# Patient Record
Sex: MALE | Race: BLACK OR AFRICAN AMERICAN | NOT HISPANIC OR LATINO | Employment: FULL TIME | URBAN - METROPOLITAN AREA
[De-identification: names, ages, dates, MRNs, and addresses within clinical notes are randomized per-mention and may not be internally consistent; named-entity substitution may affect disease eponyms.]

---

## 2017-01-04 ENCOUNTER — GENERIC CONVERSION - ENCOUNTER (OUTPATIENT)
Dept: OTHER | Facility: OTHER | Age: 32
End: 2017-01-04

## 2017-02-02 ENCOUNTER — GENERIC CONVERSION - ENCOUNTER (OUTPATIENT)
Dept: OTHER | Facility: OTHER | Age: 32
End: 2017-02-02

## 2017-12-08 ENCOUNTER — ALLSCRIPTS OFFICE VISIT (OUTPATIENT)
Dept: OTHER | Facility: OTHER | Age: 32
End: 2017-12-08

## 2017-12-08 ENCOUNTER — GENERIC CONVERSION - ENCOUNTER (OUTPATIENT)
Dept: OTHER | Facility: OTHER | Age: 32
End: 2017-12-08

## 2017-12-08 DIAGNOSIS — E78.5 HYPERLIPIDEMIA: ICD-10-CM

## 2017-12-08 DIAGNOSIS — E55.9 VITAMIN D DEFICIENCY: ICD-10-CM

## 2017-12-08 DIAGNOSIS — Z00.00 ENCOUNTER FOR GENERAL ADULT MEDICAL EXAMINATION WITHOUT ABNORMAL FINDINGS: ICD-10-CM

## 2017-12-09 LAB
25(OH)D3 SERPL-MCNC: 48.2 NG/ML (ref 30–100)
A/G RATIO (HISTORICAL): 1.9 (ref 1.2–2.2)
ALBUMIN SERPL BCP-MCNC: 4.5 G/DL (ref 3.5–5.5)
ALP SERPL-CCNC: 51 IU/L (ref 39–117)
ALT SERPL W P-5'-P-CCNC: 16 IU/L (ref 0–44)
AST SERPL W P-5'-P-CCNC: 21 IU/L (ref 0–40)
BASOPHILS # BLD AUTO: 0 %
BASOPHILS # BLD AUTO: 0 X10E3/UL (ref 0–0.2)
BILIRUB SERPL-MCNC: 0.5 MG/DL (ref 0–1.2)
BUN SERPL-MCNC: 12 MG/DL (ref 6–20)
BUN/CREA RATIO (HISTORICAL): 12 (ref 9–20)
CALCIUM SERPL-MCNC: 9.5 MG/DL (ref 8.7–10.2)
CHLORIDE SERPL-SCNC: 100 MMOL/L (ref 96–106)
CHOLEST SERPL-MCNC: 210 MG/DL (ref 100–199)
CO2 SERPL-SCNC: 23 MMOL/L (ref 18–29)
CREAT SERPL-MCNC: 0.99 MG/DL (ref 0.76–1.27)
DEPRECATED RDW RBC AUTO: 14.2 % (ref 12.3–15.4)
EGFR AFRICAN AMERICAN (HISTORICAL): 116 ML/MIN/1.73
EGFR-AMERICAN CALC (HISTORICAL): 100 ML/MIN/1.73
EOSINOPHIL # BLD AUTO: 0.1 X10E3/UL (ref 0–0.4)
EOSINOPHIL # BLD AUTO: 3 %
GLUCOSE SERPL-MCNC: 74 MG/DL (ref 65–99)
HCT VFR BLD AUTO: 43.2 % (ref 37.5–51)
HDLC SERPL-MCNC: 63 MG/DL
HGB BLD-MCNC: 14 G/DL (ref 13–17.7)
IMM.GRANULOCYTES (CD4/8) (HISTORICAL): 0 %
IMM.GRANULOCYTES (CD4/8) (HISTORICAL): 0 X10E3/UL (ref 0–0.1)
LDLC SERPL CALC-MCNC: 136 MG/DL (ref 0–99)
LYMPHOCYTES # BLD AUTO: 2 X10E3/UL (ref 0.7–3.1)
LYMPHOCYTES # BLD AUTO: 48 %
MCH RBC QN AUTO: 22.7 PG (ref 26.6–33)
MCHC RBC AUTO-ENTMCNC: 32.4 G/DL (ref 31.5–35.7)
MCV RBC AUTO: 70 FL (ref 79–97)
MONOCYTES # BLD AUTO: 0.3 X10E3/UL (ref 0.1–0.9)
MONOCYTES (HISTORICAL): 7 %
NEUTROPHILS # BLD AUTO: 1.8 X10E3/UL (ref 1.4–7)
NEUTROPHILS # BLD AUTO: 42 %
PLATELET # BLD AUTO: 296 X10E3/UL (ref 150–379)
POTASSIUM SERPL-SCNC: 4.4 MMOL/L (ref 3.5–5.2)
RBC (HISTORICAL): 6.16 X10E6/UL (ref 4.14–5.8)
SODIUM SERPL-SCNC: 141 MMOL/L (ref 134–144)
TOT. GLOBULIN, SERUM (HISTORICAL): 2.4 G/DL (ref 1.5–4.5)
TOTAL PROTEIN (HISTORICAL): 6.9 G/DL (ref 6–8.5)
TRIGL SERPL-MCNC: 56 MG/DL (ref 0–149)
WBC # BLD AUTO: 4.3 X10E3/UL (ref 3.4–10.8)

## 2017-12-10 ENCOUNTER — GENERIC CONVERSION - ENCOUNTER (OUTPATIENT)
Dept: OTHER | Facility: OTHER | Age: 32
End: 2017-12-10

## 2018-01-11 NOTE — RESULT NOTES
Message   labs show elevated cholesterol and needs low cholesterol diet and exercise, also his liver enzyme is borderline elevated, rec  low fat diet and exercise  Verified Results  (1) COMPREHENSIVE METABOLIC PANEL [id number] 60:41LG Samantha Gottlieb     Test Name Result Flag Reference   Glucose, Serum 70 mg/dL  65-99   Specimen received hemolyzed  Clinical correlation indicated  BUN 16 mg/dL  6-20   Creatinine, Serum 1 11 mg/dL  0 76-1 27   eGFR If NonAfricn Am 88 mL/min/1 73  >59   eGFR If Africn Am 102 mL/min/1 73  >59   BUN/Creatinine Ratio 14  8-19   Sodium, Serum 139 mmol/L  134-144   Potassium, Serum 5 2 mmol/L  3 5-5 2   Chloride, Serum 97 mmol/L     Carbon Dioxide, Total 22 mmol/L  18-29   Calcium, Serum 9 6 mg/dL  8 7-10 2   Protein, Total, Serum 7 4 g/dL  6 0-8 5   Albumin, Serum 4 6 g/dL  3 5-5 5   Globulin, Total 2 8 g/dL  1 5-4 5   A/G Ratio 1 6  1 1-2 5   Bilirubin, Total 0 5 mg/dL  0 0-1 2   Alkaline Phosphatase, S 53 IU/L     AST (SGOT) 46 IU/L H 0-40   ALT (SGPT) 30 IU/L  0-44     (1) VITAMIN D 25-HYDROXY [id number] 10:53AM Samantha Gottlieb     Test Name Result Flag Reference   Vitamin D, 25-Hydroxy 42 4 ng/mL  30 0-100 0   Vitamin D deficiency has been defined by the [place]  [employer] and an Endocrine Society practice guideline as a  level of serum 25-OH vitamin D less than 20 ng/mL (1,2)  The Endocrine Society went on to further define vitamin D  insufficiency as a level between 21 and 29 ng/mL (2)  1  IOM ([place] [employer])  2010  Dietary reference     intakes for calcium and D  [facility]: The     [facility]  2  Dottie MF, Gilberto NEWTON, Ping MARINA, et al      Evaluation, treatment, and prevention of vitamin D     deficiency: an Endocrine Society clinical practice     guideline  JCEM  2011 Jul; 96(7):1911-30       (1) LIPID PANEL FASTING W DIRECT LDL REFLEX [id number] 10:53AM Samantha Gottlieb     Test Name Result Flag Reference Cholesterol, Total 215 mg/dL H 100-199   Triglycerides 47 mg/dL  0-149   HDL Cholesterol 51 mg/dL  >39   LDL Cholesterol Calc 155 mg/dL H 0-99

## 2018-01-14 NOTE — PROGRESS NOTES
Assessment    1  Encounter for preventive health examination (V70 0) (Z00 00)   2  Vitamin D deficiency (268 9) (E55 9)   3  Hyperlipidemia (272 4) (E78 5)    Plan  Health Maintenance, Hyperlipidemia, Vitamin D deficiency    · (1) COMPREHENSIVE METABOLIC PANEL; Status:Active; Requested for:43Kld3316;    · (1) LIPID PANEL FASTING W DIRECT LDL REFLEX; Status:Active; Requested  for:26Ueg6193;    · (1) VITAMIN D 25-HYDROXY; Status:Active; Requested for:49Ldj9181;    · Follow-up visit in 1 year Evaluation and Treatment  Follow-up  Status: Complete  Done:  [id number]    Discussion/Summary  Impression: health maintenance visit  Currently, he eats an adequate diet and has an adequate exercise regimen  Testicular cancer screening: the risks and benefits of testicular cancer screening were discussed  Screening lab work includes glucose, lipid profile and 25-hydroxyvitamin D  The risks and benefits of immunizations were discussed  He was advised to be evaluated by a dentist  Advice and education were given regarding nutrition, aerobic exercise, weight bearing exercise, vitamin D supplements, reproductive health, self skin examination and seat belt use  General PE done today  Take Vitamin D as directed  Low cholesterol diet encouraged  Get labs as directed for hx of elevated LDL but elevated HDL  Exercise as directed  Flexibility encouraged, cardio exercise encouraged  Pt  is going to school for being a  PhD scholarship  The treatment plan was reviewed with the patient/guardian  The patient/guardian understands and agrees with the treatment plan   The patient was counseled regarding  Chief Complaint  Pt here for yearly physical, no problems or concerns  Depression screening negative  History of Present Illness  HM, Adult Male: The patient is being seen for a health maintenance evaluation  General Health: The patient's health since the last visit is described as good  He has regular dental visits   He denies vision problems  He denies hearing loss  Immunizations status: up to date  Lifestyle:  He consumes a diverse and healthy diet  He does not have any weight concerns  He exercises regularly  He does not use tobacco  He denies alcohol use  He denies drug use  Screening: cancer screening reviewed and current  metabolic screening reviewed and current  risk screening reviewed and current  HPI: Pt here for yearly physical, no problems or concerns  Depression screening negative  Hx of high LDL and vitamin D deficiency, Works for [employer] and lives in [place]  Pt  Is in a relationship and is celibate  He has no cp or sob, or ha  He exercises and eats healthy  Trying to do more cardio exercise also  He is lifting weights  He is going for PhD to be a  via scholarship also  No abdominal pain or orthopedic issues  Review of Systems    Constitutional: No fever or chills, feels well, no tiredness, no recent weight gain or weight loss  Eyes: No complaints of eye pain, no red eyes, no discharge from eyes, no itchy eyes  ENT: no complaints of earache, no hearing loss, no nosebleeds, no nasal discharge, no sore throat, no hoarseness  Cardiovascular: No complaints of slow heart rate, no fast heart rate, no chest pain, no palpitations, no leg claudication, no lower extremity  Respiratory: No complaints of shortness of breath, no wheezing, no cough, no SOB on exertion, no orthopnea or PND  Gastrointestinal: No complaints of abdominal pain, no constipation, no nausea or vomiting, no diarrhea or bloody stools  Genitourinary: No complaints of dysuria, no incontinence, no hesitancy, no nocturia, no genital lesion, no testicular pain  Musculoskeletal: No complaints of arthralgia, no myalgias, no joint swelling or stiffness, no limb pain or swelling  Integumentary: No complaints of skin rash or skin lesions, no itching, no skin wound, no dry skin     Neurological: No compliants of headache, no confusion, no convulsions, no numbness or tingling, no dizziness or fainting, no limb weakness, no difficulty walking  Psychiatric: Is not suicidal, no sleep disturbances, no anxiety or depression, no change in personality, no emotional problems  Endocrine: No complaints of proptosis, no hot flashes, no muscle weakness, no erectile dysfunction, no deepening of the voice, no feelings of weakness  Hematologic/Lymphatic: No complaints of swollen glands, no swollen glands in the neck, does not bleed easily, no easy bruising  Active Problems    1  Acne (706 1) (L70 9)   2  Alpha Thalassemia Trait (282 46)   3  Antibody Response (V72 61)   4  Hyperlipidemia (272 4) (E78 5)   5  Neck pain (723 1) (M54 2)   6  History of Need for MMR vaccine (V06 4) (Z23)   7  Screening examination for pulmonary tuberculosis (V74 1) (Z11 1)   8  Vitamin D deficiency (268 9) (E55 9)    Past Medical History    · History of Cellulitis (682 9) (L03 90)   · History of Contusion, nose (920) (S00 33XA)   · History of Need for MMR vaccine (V06 4) (Z23)   · History of Pain of finger of right hand (729 5) (M79 644)   · History of Screening for STD (sexually transmitted disease) (V74 5) (Z11 3)    Surgical History    · History of Oral Surgery    Family History  Mother    · Family history of diabetes mellitus (V18 0) (Z83 3)  Father    · Family history of hypertension (V17 49) (Z82 49)    Social History    · Being A Social Drinker   · Never A Smoker    Current Meds   1  Vitamin D3 3000 UNIT Oral Tablet; Take one tablet daily as directed; Therapy: 00BIO0340 to (Last Rx:44Qfw1445) Ordered    Allergies    1  No Known Drug Allergies    Vitals   Recorded: 23FBL5790 19:07FF   Systolic 204   Diastolic 80   Height 5 ft 7 in   Weight 181 lb 8 0 oz   BMI Calculated 28 43   BSA Calculated 1 94     Physical Exam    Constitutional   General appearance: No acute distress, well appearing and well nourished      Eyes   Conjunctiva and lids: No erythema, swelling or discharge  Pupils and irises: Equal, round, reactive to light  Ophthalmoscopic examination: Normal fundi and optic discs  Ears, Nose, Mouth, and Throat   External inspection of ears and nose: Normal     Otoscopic examination: Tympanic membranes translucent with normal light reflex  Canals patent without erythema  Hearing: Normal     Nasal mucosa, septum, and turbinates: Normal without edema or erythema  Lips, teeth, and gums: Normal, good dentition  Oropharynx: Normal with no erythema, edema, exudate or lesions  Neck   Neck: Supple, symmetric, trachea midline, no masses  Thyroid: Normal, no thyromegaly  Pulmonary   Respiratory effort: No increased work of breathing or signs of respiratory distress  Percussion of chest: Normal     Palpation of chest: Normal     Auscultation of lungs: Clear to auscultation  Cardiovascular   Palpation of heart: Normal PMI, no thrills  Auscultation of heart: Normal rate and rhythm, normal S1 and S2, no murmurs  Carotid pulses: 2+ bilaterally  Abdominal aorta: Normal     Femoral pulses: 2+ bilaterally  Pedal pulses: 2+ bilaterally  Examination of extremities for edema and/or varicosities: Normal     Chest   Breasts: Normal, no dimpling or skin changes appreciated  Palpation of breasts and axillae: Normal, no masses palpated  Chest: Normal     Abdomen   Abdomen: Non-tender, no masses  Liver and spleen: No hepatomegaly or splenomegaly  Examination for hernias: No hernias appreciated  Anus, perineum, and rectum: Normal sphincter tone, no masses, no prolapse  Stool sample for occult blood: Negative  Genitourinary   Scrotal contents: Normal testes, no masses  Penis: Normal, no lesions  Digital rectal exam of prostate: Normal size, no masses  Lymphatic   Palpation of lymph nodes in neck: No lymphadenopathy  Palpation of lymph nodes in axillae: No lymphadenopathy      Palpation of lymph nodes in groin: No lymphadenopathy  Palpation of lymph nodes in other areas: No lymphadenopathy  Musculoskeletal   Gait and station: Normal     Inspection/palpation of digits and nails: Normal without clubbing or cyanosis  Inspection/palpation of joints, bones, and muscles: Normal     Range of motion: Normal     Stability: Normal     Muscle strength/tone: Normal     Skin   Skin and subcutaneous tissue: Normal without rashes or lesions  Palpation of skin and subcutaneous tissue: Normal turgor  Neurologic   Cranial nerves: Cranial nerves 2-12 intact  Reflexes: 2+ and symmetric  Sensation: No sensory loss  Psychiatric   Judgment and insight: Normal     Orientation to person, place and time: Normal     Recent and remote memory: Intact  Mood and affect: Normal        Results/Data  PHQ-2 Adult Depression Screening [id number] 09:55AM User, s     Test Name Result Flag Reference   PHQ-2 Adult Depression Score 0     Over the last two weeks, how often have you been bothered by any of the following problems?   Little interest or pleasure in doing things: Not at all - 0  Feeling down, depressed, or hopeless: Not at all - 0   PHQ-2 Adult Depression Screening Negative         Signatures   Electronically signed by : Mela Keyes DO; Dec 30 2016 10:25AM EST                       (Author)

## 2018-01-17 NOTE — RESULT NOTES
Message   HIs LDL cholesterol is elevated but HDL is good  Rec  low cholesterol diet and high fiber diet to help lower bad cholesterol and rec  exercise to increase HDL  Recheck lipids in 6 months with cmp  Verified Results  (1) COMPREHENSIVE METABOLIC PANEL [id number] 68:06HB Josiephine Chimera     Test Name Result Flag Reference   Glucose, Serum 86 mg/dL  65-99   BUN 11 mg/dL  6-20   Creatinine, Serum 0 95 mg/dL  0 76-1 27   eGFR If NonAfricn Am 107 mL/min/1 73  >59   eGFR If Africn Am 124 mL/min/1 73  >59   BUN/Creatinine Ratio 12  8-19   Sodium, Serum 141 mmol/L  134-144   Potassium, Serum 4 3 mmol/L  3 5-5 2   Chloride, Serum 100 mmol/L     Carbon Dioxide, Total 26 mmol/L  18-29   Calcium, Serum 9 9 mg/dL  8 7-10 2   Protein, Total, Serum 7 6 g/dL  6 0-8 5   Albumin, Serum 4 9 g/dL  3 5-5 5   Globulin, Total 2 7 g/dL  1 5-4 5   A/G Ratio 1 8  1 1-2 5   Bilirubin, Total 0 4 mg/dL  0 0-1 2   Alkaline Phosphatase, S 49 IU/L     AST (SGOT) 18 IU/L  0-40   ALT (SGPT) 13 IU/L  0-44     (LC) Lipid Panel 01Apr2016 02:20PM Josiephine Chimera     Test Name Result Flag Reference   Cholesterol, Total 213 mg/dL H 100-199   Triglycerides 50 mg/dL  0-149   HDL Cholesterol 56 mg/dL  >39   According to ATP-III Guidelines, HDL-C >59 mg/dL is considered a  negative risk factor for CHD     VLDL Cholesterol Rico 10 mg/dL  5-40   LDL Cholesterol Calc 147 mg/dL H 0-99     (1) HEMOGLOBIN A1C 01Apr2016 02:20PM Josiephine Chimera     Test Name Result Flag Reference   Hemoglobin A1c 5 6 %  4 8-5 6   Pre-diabetes: 5 7 - 6 4           Diabetes: >6 4           Glycemic control for adults with diabetes: <7 0     (1) TSH 01Apr2016 02:20PM Josiephine Chimera     Test Name Result Flag Reference   TSH 2 140 uIU/mL  0 450-4 500     [facility]) Thyroxine (T4) Free, Direct, S 01Apr2016 02:20PM Josiephine Chimera     Test Name Result Flag Reference   T4,Free(Direct) 1 42 ng/dL  0 82-1 77     (1) VITAMIN D 25-HYDROXY 01Apr2016 02:20PM Moses Manzano [address]     Test Name Result Flag Reference   Vitamin D, 25-Hydroxy 48 9 ng/mL  30 0-100 0   Vitamin D deficiency has been defined by the [address] practice guideline as a  level of serum 25-OH vitamin D less than 20 ng/mL (1,2)  The Endocrine Society went on to further define vitamin D  insufficiency as a level between 21 and 29 ng/mL (2)  1  IOM ([place] [employer])  2010  Dietary reference     intakes for calcium and D  [facility]: The     [employer]  2  Dottie REA, Gilberto NEWTON, Ping MARINA, et al      Evaluation, treatment, and prevention of vitamin D     deficiency: an Endocrine Society clinical practice     guideline  JCEM  2011 Jul; 96(7):1911-30

## 2018-01-17 NOTE — RESULT NOTES
Message  Patient, Haroldo Gaytan is exempt from vaccination against influenza        Signatures   Electronically signed by : Riky Miller, ; Feb 2 2017  4:15PM EST                       (Author)

## 2018-01-23 VITALS
HEIGHT: 67 IN | WEIGHT: 174.25 LBS | DIASTOLIC BLOOD PRESSURE: 84 MMHG | SYSTOLIC BLOOD PRESSURE: 132 MMHG | BODY MASS INDEX: 27.35 KG/M2

## 2018-01-23 NOTE — PROGRESS NOTES
Assessment    1  Encounter for preventive health examination (V70 0) (Z00 00)   2  Hyperlipidemia (272 4) (E78 5)   3  Vitamin D deficiency (268 9) (E55 9)   4  Alpha thalassemia trait (282 46) (D56 3)    Plan  Alpha thalassemia trait, Health Maintenance, Hyperlipidemia, Vitamin D deficiency    · (1) CBC/PLT/DIFF; Status:Active; Requested for:45Wux4867;    · Follow-up visit in 1 year Evaluation and Treatment  Follow-up  Status: Complete  Done:  14EUN8262  Health Maintenance, Hyperlipidemia, Vitamin D deficiency    · (1) COMPREHENSIVE METABOLIC PANEL; Status:Active; Requested for:47Gdr9262;    · (1) LIPID PANEL FASTING W DIRECT LDL REFLEX; Status:Active; Requested  for:88Cur7854;    · (1) VITAMIN D 25-HYDROXY; Status:Active; Requested for:67Dgt9359;     Discussion/Summary  Impression: health maintenance visit  Currently, he eats an adequate diet and has an adequate exercise regimen  Testicular cancer screening: the risks and benefits of testicular cancer screening were discussed  Screening lab work includes glucose, lipid profile and 25-hydroxyvitamin D  The risks and benefits of immunizations were discussed  He was advised to be evaluated by a dentist  Advice and education were given regarding nutrition, aerobic exercise, weight bearing exercise, vitamin D supplements, reproductive health, self skin examination and seat belt use  General PE done today  Take Vitamin D as directed  Low cholesterol diet encouraged  Get labs as directed for hx of elevated LDL but elevated HDL  Exercise as directed  Flexibility encouraged, cardio exercise encouraged  Pt  is going to school for being a  PhD scholarship  Call if any problems  Recheck 1 year  The patient was counseled regarding  Possible side effects of new medications were reviewed with the patient/guardian today  The treatment plan was reviewed with the patient/guardian   The patient/guardian understands and agrees with the treatment plan      Chief Complaint  Pt here for a yearly physical  No other concerns  History of Present Illness  HPI: Here for general PE, looking to get into ENT rep for [employer]  Doing well  Taking Vitamin D 2000 IU daily and hx of hyperlipidemia  Hx of alpha thalassemia trait  No cp or sob, or ha  He is planning to get engaged soon and going to [facility] pastoral care  He does exercise and try to eat healthy and takes fish oil and flaxsed oil  Review of Systems    Constitutional: No fever or chills, feels well, no tiredness, no recent weight gain or weight loss  Eyes: No complaints of eye pain, no red eyes, no discharge from eyes, no itchy eyes  ENT: no complaints of earache, no hearing loss, no nosebleeds, no nasal discharge, no sore throat, no hoarseness  Cardiovascular: No complaints of slow heart rate, no fast heart rate, no chest pain, no palpitations, no leg claudication, no lower extremity  Respiratory: No complaints of shortness of breath, no wheezing, no cough, no SOB on exertion, no orthopnea or PND  Gastrointestinal: No complaints of abdominal pain, no constipation, no nausea or vomiting, no diarrhea or bloody stools  Genitourinary: No complaints of dysuria, no incontinence, no hesitancy, no nocturia, no genital lesion, no testicular pain  Musculoskeletal: No complaints of arthralgia, no myalgias, no joint swelling or stiffness, no limb pain or swelling  Integumentary: No complaints of skin rash or skin lesions, no itching, no skin wound, no dry skin  Neurological: No compliants of headache, no confusion, no convulsions, no numbness or tingling, no dizziness or fainting, no limb weakness, no difficulty walking  Psychiatric: Is not suicidal, no sleep disturbances, no anxiety or depression, no change in personality, no emotional problems  Endocrine: as noted in HPI  Hematologic/Lymphatic: as noted in HPI  Active Problems    1  Acne (706 1) (L70 9)   2   Alpha thalassemia trait (282 46) (D56 3) 3  Antibody Response (V72 61)   4  Hyperlipidemia (272 4) (E78 5)   5  Neck pain (723 1) (M54 2)   6  History of Need for MMR vaccine (V06 4) (Z23)   7  Screening examination for pulmonary tuberculosis (V74 1) (Z11 1)   8  Vitamin D deficiency (268 9) (E55 9)    Past Medical History    · History of Cellulitis (682 9) (L03 90)   · History of Contusion, nose (920) (S00 33XA)   · History of Need for MMR vaccine (V06 4) (Z23)   · History of Pain of finger of right hand (729 5) (M79 644)   · History of Screening for STD (sexually transmitted disease) (V74 5) (Z11 3)    Surgical History    · History of Oral Surgery    Family History  Mother    · Family history of diabetes mellitus (V18 0) (Z83 3)  Father    · Family history of hypertension (V17 49) (Z82 49)    Social History    · Being A Social Drinker   · Never A Smoker    Current Meds   1  Apple Cider Vinegar CAPS; take 1 capsule daily; Therapy: (Recorded:03Tsp5893) to Recorded   2  Fish Oil CAPS; take 1 capsule daily; Therapy: (Recorded:13Wit6398) to Recorded   3  Flaxseed Oil 1000 MG Oral Capsule; TAKE AS DIRECTED; Therapy: (Recorded:14Ibs9064) to Recorded   4  Multivitamins Oral Capsule; take 1 capsule daily; Therapy: (Recorded:73Xyv1516) to Recorded   5  Vitamin C TABS; TAKE 1 TABLET DAILY; Therapy: (Recorded:28Mdy9835) to Recorded   6  Vitamin D3 3000 UNIT Oral Tablet; Take one tablet daily as directed; Therapy: 31AGF8192 to (Last Rx:69Rqj2206) Ordered    Allergies    1  No Known Drug Allergies    Vitals   Recorded: 09JTZ2664 46:78WR   Systolic 254   Diastolic 84   Height 5 ft 7 in   Weight 174 lb 4 oz   BMI Calculated 27 29   BSA Calculated 1 91     Physical Exam    Constitutional   General appearance: No acute distress, well appearing and well nourished  Eyes   Conjunctiva and lids: No erythema, swelling or discharge  Pupils and irises: Equal, round, reactive to light  Ophthalmoscopic examination: Normal fundi and optic discs      Ears, Nose, Mouth, and Throat   External inspection of ears and nose: Normal     Otoscopic examination: Tympanic membranes translucent with normal light reflex  Canals patent without erythema  Hearing: Normal     Nasal mucosa, septum, and turbinates: Normal without edema or erythema  Lips, teeth, and gums: Normal, good dentition  Oropharynx: Normal with no erythema, edema, exudate or lesions  Neck   Neck: Supple, symmetric, trachea midline, no masses  Thyroid: Normal, no thyromegaly  Pulmonary   Respiratory effort: No increased work of breathing or signs of respiratory distress  Percussion of chest: Normal     Palpation of chest: Normal     Auscultation of lungs: Clear to auscultation  Cardiovascular   Palpation of heart: Normal PMI, no thrills  Auscultation of heart: Normal rate and rhythm, normal S1 and S2, no murmurs  Carotid pulses: 2+ bilaterally  Abdominal aorta: Normal     Femoral pulses: 2+ bilaterally  Pedal pulses: 2+ bilaterally  Examination of extremities for edema and/or varicosities: Normal     Chest   Breasts: Normal, no dimpling or skin changes appreciated  Palpation of breasts and axillae: Normal, no masses palpated  Chest: Normal     Abdomen   Abdomen: Non-tender, no masses  Liver and spleen: No hepatomegaly or splenomegaly  Examination for hernias: No hernias appreciated  Genitourinary   Scrotal contents: Normal testes, no masses  Penis: Normal, no lesions  Lymphatic   Palpation of lymph nodes in neck: No lymphadenopathy  Palpation of lymph nodes in axillae: No lymphadenopathy  Palpation of lymph nodes in groin: No lymphadenopathy  Palpation of lymph nodes in other areas: No lymphadenopathy  Musculoskeletal   Gait and station: Normal     Inspection/palpation of digits and nails: Normal without clubbing or cyanosis      Inspection/palpation of joints, bones, and muscles: Normal     Range of motion: Normal     Stability: Normal     Muscle strength/tone: Normal     Skin   Skin and subcutaneous tissue: Normal without rashes or lesions  Palpation of skin and subcutaneous tissue: Normal turgor  Neurologic   Cranial nerves: Cranial nerves 2-12 intact  Reflexes: 2+ and symmetric  Sensation: No sensory loss  Psychiatric   Judgment and insight: Normal     Orientation to person, place and time: Normal     Recent and remote memory: Intact      Mood and affect: Normal        Future Appointments    Date/Time Provider Specialty Site   12/14/2018 10:00 AM Carlita Hall DO Family Medicine [facility]     Signatures   Electronically signed by : Jojo Hunter DO; Dec  8 2017  1:58PM EST                       (Author)

## 2018-01-23 NOTE — RESULT NOTES
Verified Results  (1) COMPREHENSIVE METABOLIC PANEL [id number] 30:25XJ Bijan Greene     Test Name Result Flag Reference   Glucose, Serum 74 mg/dL  65-99   BUN 12 mg/dL  6-20   Creatinine, Serum 0 99 mg/dL  0 76-1 27   BUN/Creatinine Ratio 12  9-20   Sodium, Serum 141 mmol/L  134-144   Potassium, Serum 4 4 mmol/L  3 5-5 2   Chloride, Serum 100 mmol/L     Carbon Dioxide, Total 23 mmol/L  18-29   Calcium, Serum 9 5 mg/dL  8 7-10 2   Protein, Total, Serum 6 9 g/dL  6 0-8 5   Albumin, Serum 4 5 g/dL  3 5-5 5   Globulin, Total 2 4 g/dL  1 5-4 5   A/G Ratio 1 9  1 2-2 2   Bilirubin, Total 0 5 mg/dL  0 0-1 2   Alkaline Phosphatase, S 51 IU/L     AST (SGOT) 21 IU/L  0-40   ALT (SGPT) 16 IU/L  0-44   eGFR If NonAfricn Am 100 mL/min/1 73  >59   eGFR If Africn Am 116 mL/min/1 73  >59     (1) VITAMIN D 25-HYDROXY [id number] 02:22PM Cristina Reza courtesy copy of this report has been sent to  the patient  Test Name Result Flag Reference   Vitamin D, 25-Hydroxy 48 2 ng/mL  30 0-100 0   Vitamin D deficiency has been defined by the [address] practice guideline as a  level of serum 25-OH vitamin D less than 20 ng/mL (1,2)  The Endocrine Society went on to further define vitamin D  insufficiency as a level between 21 and 29 ng/mL (2)  1  IOM ([place] of [employer])  2010  Dietary reference     intakes for calcium and D  [facility]  2  Dottie MF, Gilberto NC, Ping MARINA, et al      Evaluation, treatment, and prevention of vitamin D     deficiency: an Endocrine Society clinical practice     guideline  JCEM  2011 Jul; 96(7):1911-30       (1) CBC/PLT/DIFF [id number] 02:22PM Bijan Greene     Test Name Result Flag Reference   WBC 4 3 x10E3/uL  3 4-10 8   RBC 6 16 x10E6/uL H 4 14-5 80   Hemoglobin 14 0 g/dL  13 0-17 7   **Please note reference interval change**   Hematocrit 43 2 %  37 5-51 0   MCV 70 fL L 79-97   MCH 22 7 pg L 26 6-33 0   MCHC 32 4 g/dL  31 5-35 7   RDW 14 2 %  12 3-15 4   Platelets 329 T05M9/XK  150-379   Neutrophils 42 %  Not Estab  Lymphs 48 %  Not Estab  Monocytes 7 %  Not Estab  Eos 3 %  Not Estab  Basos 0 %  Not Estab  Neutrophils (Absolute) 1 8 x10E3/uL  1 4-7 0   Lymphs (Absolute) 2 0 x10E3/uL  0 7-3 1   Monocytes(Absolute) 0 3 x10E3/uL  0 1-0 9   Eos (Absolute) 0 1 x10E3/uL  0 0-0 4   Baso (Absolute) 0 0 x10E3/uL  0 0-0 2   Immature Granulocytes 0 %  Not Estab     Immature Grans (Abs) 0 0 x10E3/uL  0 0-0 1     (1) LIPID PANEL FASTING W DIRECT LDL REFLEX [id number] 02:22PM Abbey Avery     Test Name Result Flag Reference   Cholesterol, Total 210 mg/dL H 100-199   Triglycerides 56 mg/dL  0-149   HDL Cholesterol 63 mg/dL  >39   LDL Cholesterol Calc 136 mg/dL H 0-99

## 2018-02-22 ENCOUNTER — TELEPHONE (OUTPATIENT)
Dept: FAMILY MEDICINE CLINIC | Facility: CLINIC | Age: 33
End: 2018-02-22

## 2018-02-22 NOTE — TELEPHONE ENCOUNTER
Patient called requesting a copy of his immunization records for his new employment  A copy was printed for the patient  He may need titers for varicella and hepatitis B immunity but will check with his place of employment and call if this is required

## 2018-12-13 ENCOUNTER — OFFICE VISIT (OUTPATIENT)
Dept: FAMILY MEDICINE CLINIC | Facility: CLINIC | Age: 33
End: 2018-12-13
Payer: COMMERCIAL

## 2018-12-13 VITALS
WEIGHT: 180.2 LBS | DIASTOLIC BLOOD PRESSURE: 82 MMHG | SYSTOLIC BLOOD PRESSURE: 124 MMHG | BODY MASS INDEX: 28.28 KG/M2 | HEIGHT: 67 IN

## 2018-12-13 DIAGNOSIS — Z00.00 HEALTH CARE MAINTENANCE: ICD-10-CM

## 2018-12-13 DIAGNOSIS — R06.83 SNORING: ICD-10-CM

## 2018-12-13 DIAGNOSIS — B35.4 TINEA CORPORIS: Primary | ICD-10-CM

## 2018-12-13 DIAGNOSIS — E78.5 HYPERLIPIDEMIA, UNSPECIFIED HYPERLIPIDEMIA TYPE: ICD-10-CM

## 2018-12-13 PROCEDURE — 99395 PREV VISIT EST AGE 18-39: CPT | Performed by: FAMILY MEDICINE

## 2018-12-13 RX ORDER — CLOTRIMAZOLE AND BETAMETHASONE DIPROPIONATE 10; .64 MG/G; MG/G
CREAM TOPICAL 2 TIMES DAILY
Qty: 30 G | Refills: 0 | Status: SHIPPED | OUTPATIENT
Start: 2018-12-13 | End: 2018-12-13 | Stop reason: SDUPTHER

## 2018-12-13 RX ORDER — ECHINACEA 400 MG
CAPSULE ORAL
COMMUNITY

## 2018-12-13 RX ORDER — RIBOFLAVIN (VITAMIN B2) 100 MG
1 TABLET ORAL DAILY
COMMUNITY

## 2018-12-13 RX ORDER — CLOTRIMAZOLE AND BETAMETHASONE DIPROPIONATE 10; .64 MG/G; MG/G
CREAM TOPICAL 2 TIMES DAILY
Qty: 30 G | Refills: 0 | Status: SHIPPED | OUTPATIENT
Start: 2018-12-13 | End: 2021-01-18

## 2018-12-13 RX ORDER — GLUCOSAMINE HCL 500 MG
1 TABLET ORAL DAILY
COMMUNITY
Start: 2014-12-04

## 2018-12-13 NOTE — PATIENT INSTRUCTIONS
Here for general PE and doing well, hx of hyperlipidemia and will need to have cholesterol rechecked  Diet and exercise encouraged  Use Selsun as directed for tinea versicolor

## 2018-12-13 NOTE — PROGRESS NOTES
Assessment/Plan:  Chief Complaint   Patient presents with    Physical Exam    Rash     discoloration on the abdomen  Patient Instructions   Here for general PE and doing well, hx of hyperlipidemia and will need to have cholesterol rechecked  Diet and exercise encouraged  Use Selsun as directed for tinea versicolor  No problem-specific Assessment & Plan notes found for this encounter  Diagnoses and all orders for this visit:    Tinea corporis  -     Discontinue: clotrimazole-betamethasone (LOTRISONE) 1-0 05 % cream; Apply topically 2 (two) times a day  -     clotrimazole-betamethasone (LOTRISONE) 1-0 05 % cream; Apply topically 2 (two) times a day    Health care maintenance  -     Comprehensive metabolic panel; Future  -     Lipid Panel with Direct LDL reflex; Future  -     CBC and differential; Future    Hyperlipidemia, unspecified hyperlipidemia type  -     Comprehensive metabolic panel; Future  -     Lipid Panel with Direct LDL reflex; Future    Snoring    Other orders  -     Omega-3 Fatty Acids (FISH OIL) 645 MG CAPS; Take 1 capsule by mouth daily  -     Apple Cider Vinegar 188 MG CAPS; Take 1 capsule by mouth daily  -     Flaxseed, Linseed, (FLAXSEED OIL) 1000 MG CAPS; Take by mouth  -     Pediatric Multivitamins-Fl (MULTIVITAMINS/FL PO); Take 1 capsule by mouth daily  -     Ascorbic Acid (VITAMIN C) 100 MG tablet; Take 1 tablet by mouth daily  -     Cholecalciferol (VITAMIN D3) 3000 units TABS; Take 1 tablet by mouth daily          Subjective:      Patient ID: Audrey Campbell is a 35 y o  male  Physical Exam   Rash (discoloration on the abdomen  )    Pt  Now works for [employer] and also lives in [place], [place] and is engaged to be  July 26, 2019  Exercise 6 days per week and does cardio and weights and plays basketball, takes MVI and fish oil and flax seed and apple cider vinegar and vitamin C and D  Pt  Eats plant based protein  Does snore but does feel refreshed in am when waking   Hx of tinea corporis on abdomen  The following portions of the patient's history were reviewed and updated as appropriate: allergies, current medications, past family history, past medical history, past social history, past surgical history and problem list     Review of Systems   Constitutional: Negative  HENT: Negative  Eyes: Negative  Respiratory: Negative  Cardiovascular: Negative  Gastrointestinal: Negative  Endocrine: Negative  Genitourinary: Negative  Musculoskeletal: Negative  Skin:        Tinea corporis on abdomen  Allergic/Immunologic: Negative  Neurological: Negative  Hematological: Negative  Psychiatric/Behavioral: Negative  Objective:      /82   Ht 5' 7" (1 702 m)   Wt 81 7 kg (180 lb 3 2 oz)   BMI 28 22 kg/m²          Physical Exam   Constitutional: He is oriented to person, place, and time  He appears well-developed and well-nourished  HENT:   Head: Normocephalic and atraumatic  Right Ear: External ear normal    Left Ear: External ear normal    Nose: Nose normal    Mouth/Throat: Oropharynx is clear and moist    Eyes: Pupils are equal, round, and reactive to light  Conjunctivae and EOM are normal    Neck: Normal range of motion  Neck supple  Cardiovascular: Normal rate, regular rhythm, normal heart sounds and intact distal pulses  Pulmonary/Chest: Effort normal and breath sounds normal    Abdominal: Soft  Bowel sounds are normal    Genitourinary: Penis normal    Musculoskeletal: Normal range of motion  Neurological: He is alert and oriented to person, place, and time  He has normal reflexes  Skin: Skin is warm and dry  Tinea corporis   Psychiatric: He has a normal mood and affect   His behavior is normal

## 2018-12-16 LAB
ALBUMIN SERPL-MCNC: 4.4 G/DL (ref 3.6–5.1)
ALBUMIN/GLOB SERPL: 1.8 (CALC) (ref 1–2.5)
ALP SERPL-CCNC: 51 U/L (ref 40–115)
ALT SERPL-CCNC: 17 U/L (ref 9–46)
AST SERPL-CCNC: 18 U/L (ref 10–40)
BASOPHILS # BLD AUTO: 21 CELLS/UL (ref 0–200)
BASOPHILS NFR BLD AUTO: 0.5 %
BILIRUB SERPL-MCNC: 0.4 MG/DL (ref 0.2–1.2)
BUN SERPL-MCNC: 11 MG/DL (ref 7–25)
BUN/CREAT SERPL: NORMAL (CALC) (ref 6–22)
CALCIUM SERPL-MCNC: 9.4 MG/DL (ref 8.6–10.3)
CHLORIDE SERPL-SCNC: 104 MMOL/L (ref 98–110)
CHOLEST SERPL-MCNC: 176 MG/DL
CHOLEST/HDLC SERPL: 3.4 (CALC)
CO2 SERPL-SCNC: 29 MMOL/L (ref 20–32)
CREAT SERPL-MCNC: 1.12 MG/DL (ref 0.6–1.35)
EOSINOPHIL # BLD AUTO: 111 CELLS/UL (ref 15–500)
EOSINOPHIL NFR BLD AUTO: 2.7 %
ERYTHROCYTE [DISTWIDTH] IN BLOOD BY AUTOMATED COUNT: 13.7 % (ref 11–15)
GLOBULIN SER CALC-MCNC: 2.4 G/DL (CALC) (ref 1.9–3.7)
GLUCOSE SERPL-MCNC: 77 MG/DL (ref 65–99)
HCT VFR BLD AUTO: 42.7 % (ref 38.5–50)
HDLC SERPL-MCNC: 52 MG/DL
HGB BLD-MCNC: 13.1 G/DL (ref 13.2–17.1)
LDLC SERPL CALC-MCNC: 113 MG/DL (CALC)
LYMPHOCYTES # BLD AUTO: 1841 CELLS/UL (ref 850–3900)
LYMPHOCYTES NFR BLD AUTO: 44.9 %
MCH RBC QN AUTO: 22.4 PG (ref 27–33)
MCHC RBC AUTO-ENTMCNC: 30.7 G/DL (ref 32–36)
MCV RBC AUTO: 73.1 FL (ref 80–100)
MONOCYTES # BLD AUTO: 344 CELLS/UL (ref 200–950)
MONOCYTES NFR BLD AUTO: 8.4 %
NEUTROPHILS # BLD AUTO: 1784 CELLS/UL (ref 1500–7800)
NEUTROPHILS NFR BLD AUTO: 43.5 %
NONHDLC SERPL-MCNC: 124 MG/DL (CALC)
PLATELET # BLD AUTO: 295 THOUSAND/UL (ref 140–400)
PMV BLD REES-ECKER: 10.9 FL (ref 7.5–12.5)
POTASSIUM SERPL-SCNC: 4.1 MMOL/L (ref 3.5–5.3)
PROT SERPL-MCNC: 6.8 G/DL (ref 6.1–8.1)
RBC # BLD AUTO: 5.84 MILLION/UL (ref 4.2–5.8)
SL AMB EGFR AFRICAN AMERICAN: 99 ML/MIN/1.73M2
SL AMB EGFR NON AFRICAN AMERICAN: 86 ML/MIN/1.73M2
SODIUM SERPL-SCNC: 141 MMOL/L (ref 135–146)
TRIGL SERPL-MCNC: 37 MG/DL
WBC # BLD AUTO: 4.1 THOUSAND/UL (ref 3.8–10.8)

## 2018-12-17 DIAGNOSIS — D64.9 ANEMIA, UNSPECIFIED TYPE: Primary | ICD-10-CM

## 2019-01-21 ENCOUNTER — TELEPHONE (OUTPATIENT)
Dept: FAMILY MEDICINE CLINIC | Facility: CLINIC | Age: 34
End: 2019-01-21

## 2019-01-21 DIAGNOSIS — B36.0 TINEA VERSICOLOR: Primary | ICD-10-CM

## 2019-01-21 DIAGNOSIS — B36.0 TINEA VERSICOLOR: ICD-10-CM

## 2019-01-21 NOTE — TELEPHONE ENCOUNTER
Patient states that the cream is not working very well  He is asking if the body wash can be sent to [place]  He has a fungal rash

## 2019-01-31 ENCOUNTER — TELEPHONE (OUTPATIENT)
Dept: FAMILY MEDICINE CLINIC | Facility: CLINIC | Age: 34
End: 2019-01-31

## 2019-01-31 DIAGNOSIS — Z11.1 ENCOUNTER FOR SCREENING FOR RESPIRATORY TUBERCULOSIS: Primary | ICD-10-CM

## 2019-02-11 ENCOUNTER — CLINICAL SUPPORT (OUTPATIENT)
Dept: FAMILY MEDICINE CLINIC | Facility: CLINIC | Age: 34
End: 2019-02-11
Payer: COMMERCIAL

## 2019-02-11 DIAGNOSIS — Z11.1 SCREENING FOR TUBERCULOSIS: Primary | ICD-10-CM

## 2019-02-11 PROCEDURE — 86580 TB INTRADERMAL TEST: CPT

## 2019-02-14 ENCOUNTER — CLINICAL SUPPORT (OUTPATIENT)
Dept: FAMILY MEDICINE CLINIC | Facility: CLINIC | Age: 34
End: 2019-02-14

## 2019-02-14 DIAGNOSIS — Z11.1 ENCOUNTER FOR PPD SKIN TEST READING: Primary | ICD-10-CM

## 2019-02-14 LAB
INDURATION: NORMAL MM
TB SKIN TEST: NEGATIVE

## 2019-12-20 ENCOUNTER — OFFICE VISIT (OUTPATIENT)
Dept: FAMILY MEDICINE CLINIC | Facility: CLINIC | Age: 34
End: 2019-12-20
Payer: COMMERCIAL

## 2019-12-20 VITALS
SYSTOLIC BLOOD PRESSURE: 136 MMHG | DIASTOLIC BLOOD PRESSURE: 84 MMHG | OXYGEN SATURATION: 97 % | HEIGHT: 67 IN | BODY MASS INDEX: 28.22 KG/M2 | HEART RATE: 78 BPM | TEMPERATURE: 97.7 F | WEIGHT: 179.8 LBS

## 2019-12-20 DIAGNOSIS — Z23 NEED FOR TDAP VACCINATION: Primary | ICD-10-CM

## 2019-12-20 DIAGNOSIS — E66.3 OVERWEIGHT (BMI 25.0-29.9): ICD-10-CM

## 2019-12-20 DIAGNOSIS — Z13.220 SCREENING FOR HYPERLIPIDEMIA: ICD-10-CM

## 2019-12-20 DIAGNOSIS — Z00.00 HEALTH CARE MAINTENANCE: ICD-10-CM

## 2019-12-20 DIAGNOSIS — D64.9 ANEMIA, UNSPECIFIED TYPE: ICD-10-CM

## 2019-12-20 PROCEDURE — 99395 PREV VISIT EST AGE 18-39: CPT | Performed by: FAMILY MEDICINE

## 2019-12-20 RX ORDER — FERROUS SULFATE 325(65) MG
325 TABLET ORAL
COMMUNITY

## 2019-12-20 NOTE — PATIENT INSTRUCTIONS
Here for General PE and doing well, encourage weight loss to get BMI to 25 or lower  Rec checking labs for borderline mild anemia and will rec iron and iron rich foods  Diet and exercise as directed  Monitor right knee swelling that becomes stiff at times  No pain associated with exercise  Call if worse

## 2019-12-20 NOTE — PROGRESS NOTES
Assessment/Plan:  Chief Complaint   Patient presents with    Physical Exam     Pt presents for a physcial exam      Joint Swelling     Pt would like to have his R/knee checked due to some swelling  Patient Instructions   Here for General PE and doing well, encourage weight loss to get BMI to 25 or lower  Rec checking labs for borderline mild anemia and will rec iron and iron rich foods  Diet and exercise as directed  Monitor right knee swelling that becomes stiff at times  No pain associated with exercise  Call if worse  No problem-specific Assessment & Plan notes found for this encounter  Diagnoses and all orders for this visit:    Need for Tdap vaccination    Health care maintenance    Anemia, unspecified type  -     CBC and differential; Future  -     Iron; Future  -     Ferritin; Future    Overweight (BMI 25 0-29  9)    Screening for hyperlipidemia  -     Comprehensive metabolic panel; Future  -     Lipid Panel with Direct LDL reflex; Future    Other orders  -     ferrous sulfate 325 (65 Fe) mg tablet; Take 325 mg by mouth daily with breakfast          Subjective:      Patient ID: Garth Barger is a 29 y o  male  Physical Exam (Pt presents for a physcial exam  )  Joint Swelling (Pt would like to have his R/knee checked due to some swelling )    Pt  Got  over the summer  Some right knee swelling at times and also       The following portions of the patient's history were reviewed and updated as appropriate: allergies, current medications, past family history, past medical history, past social history, past surgical history and problem list     Review of Systems   Constitutional:        Overweight   HENT: Negative  Eyes: Negative  Respiratory: Negative  Cardiovascular: Negative  Gastrointestinal: Negative  Endocrine: Negative  Genitourinary: Negative  Musculoskeletal: Negative  Skin: Negative  Allergic/Immunologic: Negative  Neurological: Negative  Hematological: Negative  Psychiatric/Behavioral: Negative  Objective:      /84 (BP Location: Left arm, Patient Position: Sitting, Cuff Size: Large)   Pulse 78   Temp 97 7 °F (36 5 °C) (Temporal)   Ht 5' 7" (1 702 m)   Wt 81 6 kg (179 lb 12 8 oz)   SpO2 97%   BMI 28 16 kg/m²          Physical Exam   Constitutional: He is oriented to person, place, and time  He appears well-developed and well-nourished  overweight   HENT:   Head: Normocephalic and atraumatic  Right Ear: External ear normal    Left Ear: External ear normal    Nose: Nose normal    Mouth/Throat: Oropharynx is clear and moist    Eyes: Pupils are equal, round, and reactive to light  Conjunctivae and EOM are normal    Neck: Normal range of motion  Neck supple  Cardiovascular: Normal rate, regular rhythm, normal heart sounds and intact distal pulses  Pulmonary/Chest: Effort normal and breath sounds normal    Abdominal: Soft  Bowel sounds are normal    Musculoskeletal: Normal range of motion  Neurological: He is alert and oriented to person, place, and time  He has normal reflexes  Skin: Skin is warm and dry  Psychiatric: He has a normal mood and affect   His behavior is normal

## 2019-12-20 NOTE — PROGRESS NOTES
BMI Counseling: Body mass index is 28 16 kg/m²  The BMI is above normal  Nutrition recommendations include reducing portion sizes, decreasing overall calorie intake, 3-5 servings of fruits/vegetables daily, reducing fast food intake, consuming healthier snacks and reducing intake of cholesterol  Exercise recommendations include exercising 3-5 times per week

## 2019-12-23 LAB
ALBUMIN SERPL-MCNC: 4.4 G/DL (ref 3.6–5.1)
ALBUMIN/GLOB SERPL: 1.8 (CALC) (ref 1–2.5)
ALP SERPL-CCNC: 47 U/L (ref 40–115)
ALT SERPL-CCNC: 11 U/L (ref 9–46)
AST SERPL-CCNC: 15 U/L (ref 10–40)
BASOPHILS # BLD AUTO: 22 CELLS/UL (ref 0–200)
BASOPHILS NFR BLD AUTO: 0.6 %
BILIRUB SERPL-MCNC: 0.5 MG/DL (ref 0.2–1.2)
BUN SERPL-MCNC: 15 MG/DL (ref 7–25)
BUN/CREAT SERPL: NORMAL (CALC) (ref 6–22)
CALCIUM SERPL-MCNC: 9.5 MG/DL (ref 8.6–10.3)
CHLORIDE SERPL-SCNC: 104 MMOL/L (ref 98–110)
CHOLEST SERPL-MCNC: 207 MG/DL
CHOLEST/HDLC SERPL: 4.2 (CALC)
CO2 SERPL-SCNC: 30 MMOL/L (ref 20–32)
CREAT SERPL-MCNC: 1.19 MG/DL (ref 0.6–1.35)
EOSINOPHIL # BLD AUTO: 151 CELLS/UL (ref 15–500)
EOSINOPHIL NFR BLD AUTO: 4.2 %
ERYTHROCYTE [DISTWIDTH] IN BLOOD BY AUTOMATED COUNT: 13.4 % (ref 11–15)
FERRITIN SERPL-MCNC: 202 NG/ML (ref 38–380)
GLOBULIN SER CALC-MCNC: 2.5 G/DL (CALC) (ref 1.9–3.7)
GLUCOSE SERPL-MCNC: 79 MG/DL (ref 65–99)
HCT VFR BLD AUTO: 42.4 % (ref 38.5–50)
HDLC SERPL-MCNC: 49 MG/DL
HGB BLD-MCNC: 13.1 G/DL (ref 13.2–17.1)
IRON SERPL-MCNC: 88 MCG/DL (ref 50–180)
LDLC SERPL CALC-MCNC: 145 MG/DL (CALC)
LYMPHOCYTES # BLD AUTO: 1894 CELLS/UL (ref 850–3900)
LYMPHOCYTES NFR BLD AUTO: 52.6 %
MCH RBC QN AUTO: 21.9 PG (ref 27–33)
MCHC RBC AUTO-ENTMCNC: 30.9 G/DL (ref 32–36)
MCV RBC AUTO: 70.9 FL (ref 80–100)
MONOCYTES # BLD AUTO: 299 CELLS/UL (ref 200–950)
MONOCYTES NFR BLD AUTO: 8.3 %
NEUTROPHILS # BLD AUTO: 1235 CELLS/UL (ref 1500–7800)
NEUTROPHILS NFR BLD AUTO: 34.3 %
NONHDLC SERPL-MCNC: 158 MG/DL (CALC)
PLATELET # BLD AUTO: 290 THOUSAND/UL (ref 140–400)
PMV BLD REES-ECKER: 10.7 FL (ref 7.5–12.5)
POTASSIUM SERPL-SCNC: 4.3 MMOL/L (ref 3.5–5.3)
PROT SERPL-MCNC: 6.9 G/DL (ref 6.1–8.1)
RBC # BLD AUTO: 5.98 MILLION/UL (ref 4.2–5.8)
SL AMB EGFR AFRICAN AMERICAN: 92 ML/MIN/1.73M2
SL AMB EGFR NON AFRICAN AMERICAN: 79 ML/MIN/1.73M2
SODIUM SERPL-SCNC: 139 MMOL/L (ref 135–146)
TRIGL SERPL-MCNC: 42 MG/DL
WBC # BLD AUTO: 3.6 THOUSAND/UL (ref 3.8–10.8)

## 2021-01-18 ENCOUNTER — OFFICE VISIT (OUTPATIENT)
Dept: FAMILY MEDICINE CLINIC | Facility: CLINIC | Age: 36
End: 2021-01-18
Payer: COMMERCIAL

## 2021-01-18 VITALS
WEIGHT: 189.8 LBS | BODY MASS INDEX: 29.79 KG/M2 | SYSTOLIC BLOOD PRESSURE: 124 MMHG | TEMPERATURE: 96.8 F | HEIGHT: 67 IN | RESPIRATION RATE: 16 BRPM | HEART RATE: 69 BPM | DIASTOLIC BLOOD PRESSURE: 80 MMHG | OXYGEN SATURATION: 98 %

## 2021-01-18 DIAGNOSIS — E78.5 HYPERLIPIDEMIA, UNSPECIFIED HYPERLIPIDEMIA TYPE: ICD-10-CM

## 2021-01-18 DIAGNOSIS — E66.3 OVERWEIGHT (BMI 25.0-29.9): ICD-10-CM

## 2021-01-18 DIAGNOSIS — Z00.00 HEALTH CARE MAINTENANCE: Primary | ICD-10-CM

## 2021-01-18 DIAGNOSIS — D64.9 ANEMIA, UNSPECIFIED TYPE: ICD-10-CM

## 2021-01-18 DIAGNOSIS — E55.9 VITAMIN D DEFICIENCY: ICD-10-CM

## 2021-01-18 PROCEDURE — 1036F TOBACCO NON-USER: CPT | Performed by: FAMILY MEDICINE

## 2021-01-18 PROCEDURE — 3725F SCREEN DEPRESSION PERFORMED: CPT | Performed by: FAMILY MEDICINE

## 2021-01-18 PROCEDURE — 3008F BODY MASS INDEX DOCD: CPT | Performed by: FAMILY MEDICINE

## 2021-01-18 PROCEDURE — 99395 PREV VISIT EST AGE 18-39: CPT | Performed by: FAMILY MEDICINE

## 2021-01-18 NOTE — PROGRESS NOTES
Assessment/Plan:  Chief Complaint   Patient presents with    Well Check     Patient Instructions   Here for general PE and doing well, needs to get BMI closer to 25 or lower  Low cholesterol diet encouraged and rec rechecking labs for hx of anemia  Follow CDC guidelines for covid 19 prevention  Saw Heme/Onc in past for his anemia  No problem-specific Assessment & Plan notes found for this encounter  Diagnoses and all orders for this visit:    Health care maintenance  -     Comprehensive metabolic panel; Future  -     CBC and differential; Future  -     Lipid Panel with Direct LDL reflex; Future  -     Vitamin D 25 hydroxy; Future    Anemia, unspecified type  -     CBC and differential; Future    Hyperlipidemia, unspecified hyperlipidemia type  -     Comprehensive metabolic panel; Future  -     Lipid Panel with Direct LDL reflex; Future    Overweight (BMI 25 0-29 9)  -     Comprehensive metabolic panel; Future  -     CBC and differential; Future  -     Lipid Panel with Direct LDL reflex; Future  -     Vitamin D 25 hydroxy; Future    Vitamin D deficiency  -     Vitamin D 25 hydroxy; Future          Subjective:      Patient ID: Royer Edge is a 28 y o  male  Here for general PE and doing well  Weight gain in last year and got  and has a son age 1 months old  The following portions of the patient's history were reviewed and updated as appropriate: allergies, current medications, past family history, past medical history, past social history, past surgical history and problem list     Review of Systems   Constitutional:        Overweight   HENT: Negative  Eyes: Negative  Respiratory: Negative  Cardiovascular: Negative  Gastrointestinal: Negative  Endocrine: Negative  Genitourinary: Negative  Musculoskeletal: Negative  Skin: Negative  Allergic/Immunologic: Negative  Neurological: Negative  Hematological: Negative  Psychiatric/Behavioral: Negative  Objective:      /80   Pulse 69   Temp (!) 96 8 °F (36 °C) (Temporal)   Resp 16   Ht 5' 7" (1 702 m)   Wt 86 1 kg (189 lb 12 8 oz)   SpO2 98%   BMI 29 73 kg/m²          Physical Exam  Constitutional:       Appearance: He is well-developed  Comments: overweight   HENT:      Head: Normocephalic and atraumatic  Right Ear: External ear normal       Left Ear: External ear normal       Nose: Nose normal    Eyes:      Conjunctiva/sclera: Conjunctivae normal       Pupils: Pupils are equal, round, and reactive to light  Neck:      Musculoskeletal: Normal range of motion and neck supple  Cardiovascular:      Rate and Rhythm: Normal rate and regular rhythm  Heart sounds: Normal heart sounds  Pulmonary:      Effort: Pulmonary effort is normal       Breath sounds: Normal breath sounds  Abdominal:      General: Abdomen is flat  Bowel sounds are normal       Palpations: Abdomen is soft  Genitourinary:     Penis: Normal     Musculoskeletal: Normal range of motion  Skin:     General: Skin is warm and dry  Neurological:      Mental Status: He is alert and oriented to person, place, and time  Deep Tendon Reflexes: Reflexes are normal and symmetric     Psychiatric:         Behavior: Behavior normal

## 2021-01-18 NOTE — PATIENT INSTRUCTIONS
Here for general PE and doing well, needs to get BMI closer to 25 or lower  Low cholesterol diet encouraged and rec rechecking labs for hx of anemia  Follow CDC guidelines for covid 19 prevention  Saw Heme/Onc in past for his anemia

## 2021-01-18 NOTE — PROGRESS NOTES
BMI Counseling: Body mass index is 29 73 kg/m²  The BMI is above normal  Nutrition recommendations include reducing portion sizes, decreasing overall calorie intake, 3-5 servings of fruits/vegetables daily, reducing fast food intake, consuming healthier snacks, decreasing soda and/or juice intake, moderation in carbohydrate intake and reducing intake of cholesterol  Exercise recommendations include exercising 3-5 times per week

## 2021-01-24 LAB
25(OH)D3 SERPL-MCNC: 32 NG/ML (ref 30–100)
ALBUMIN SERPL-MCNC: 4.5 G/DL (ref 3.6–5.1)
ALBUMIN/GLOB SERPL: 1.9 (CALC) (ref 1–2.5)
ALP SERPL-CCNC: 43 U/L (ref 36–130)
ALT SERPL-CCNC: 25 U/L (ref 9–46)
AST SERPL-CCNC: 18 U/L (ref 10–40)
BASOPHILS # BLD AUTO: 28 CELLS/UL (ref 0–200)
BASOPHILS NFR BLD AUTO: 0.6 %
BILIRUB SERPL-MCNC: 0.3 MG/DL (ref 0.2–1.2)
BUN SERPL-MCNC: 12 MG/DL (ref 7–25)
BUN/CREAT SERPL: NORMAL (CALC) (ref 6–22)
CALCIUM SERPL-MCNC: 9.6 MG/DL (ref 8.6–10.3)
CHLORIDE SERPL-SCNC: 102 MMOL/L (ref 98–110)
CHOLEST SERPL-MCNC: 201 MG/DL
CHOLEST/HDLC SERPL: 3.9 (CALC)
CO2 SERPL-SCNC: 28 MMOL/L (ref 20–32)
CREAT SERPL-MCNC: 0.99 MG/DL (ref 0.6–1.35)
EOSINOPHIL # BLD AUTO: 221 CELLS/UL (ref 15–500)
EOSINOPHIL NFR BLD AUTO: 4.7 %
ERYTHROCYTE [DISTWIDTH] IN BLOOD BY AUTOMATED COUNT: 15.8 % (ref 11–15)
GLOBULIN SER CALC-MCNC: 2.4 G/DL (CALC) (ref 1.9–3.7)
GLUCOSE SERPL-MCNC: 90 MG/DL (ref 65–99)
HCT VFR BLD AUTO: 45.9 % (ref 38.5–50)
HDLC SERPL-MCNC: 52 MG/DL
HGB BLD-MCNC: 14 G/DL (ref 13.2–17.1)
LDLC SERPL CALC-MCNC: 137 MG/DL (CALC)
LYMPHOCYTES # BLD AUTO: 2378 CELLS/UL (ref 850–3900)
LYMPHOCYTES NFR BLD AUTO: 50.6 %
MCH RBC QN AUTO: 22.4 PG (ref 27–33)
MCHC RBC AUTO-ENTMCNC: 30.5 G/DL (ref 32–36)
MCV RBC AUTO: 73.6 FL (ref 80–100)
MONOCYTES # BLD AUTO: 400 CELLS/UL (ref 200–950)
MONOCYTES NFR BLD AUTO: 8.5 %
NEUTROPHILS # BLD AUTO: 1673 CELLS/UL (ref 1500–7800)
NEUTROPHILS NFR BLD AUTO: 35.6 %
NONHDLC SERPL-MCNC: 149 MG/DL (CALC)
PLATELET # BLD AUTO: 278 THOUSAND/UL (ref 140–400)
PMV BLD REES-ECKER: 11.1 FL (ref 7.5–12.5)
POTASSIUM SERPL-SCNC: 4.3 MMOL/L (ref 3.5–5.3)
PROT SERPL-MCNC: 6.9 G/DL (ref 6.1–8.1)
RBC # BLD AUTO: 6.24 MILLION/UL (ref 4.2–5.8)
SL AMB EGFR AFRICAN AMERICAN: 114 ML/MIN/1.73M2
SL AMB EGFR NON AFRICAN AMERICAN: 98 ML/MIN/1.73M2
SODIUM SERPL-SCNC: 138 MMOL/L (ref 135–146)
TRIGL SERPL-MCNC: 39 MG/DL
WBC # BLD AUTO: 4.7 THOUSAND/UL (ref 3.8–10.8)

## 2021-02-16 ENCOUNTER — TELEPHONE (OUTPATIENT)
Dept: FAMILY MEDICINE CLINIC | Facility: CLINIC | Age: 36
End: 2021-02-16

## 2021-02-16 NOTE — TELEPHONE ENCOUNTER
Pt called the office requesting his lab results from 01/23/2021  Pt went to [facility]  I did apologize to pt and made him aware that we presently have no lab results from 01/23/2021  I informed pt we will have to look into this and follow up with him     Pt's number is [phone]

## 2021-03-04 ENCOUNTER — TELEPHONE (OUTPATIENT)
Dept: SURGICAL ONCOLOGY | Facility: CLINIC | Age: 36
End: 2021-03-04

## 2021-03-04 ENCOUNTER — TELEPHONE (OUTPATIENT)
Dept: FAMILY MEDICINE CLINIC | Facility: CLINIC | Age: 36
End: 2021-03-04

## 2021-03-04 DIAGNOSIS — D64.9 ANEMIA, UNSPECIFIED TYPE: Primary | ICD-10-CM

## 2021-03-04 NOTE — TELEPHONE ENCOUNTER
[facility] called asking if patient is to be seen by them for anemia  If so they need an ambulatory referral put in so they can schedule the patient for an appointment

## 2021-03-04 NOTE — TELEPHONE ENCOUNTER
Since there was no referral in chart for pt from Dr Flakito Guzmán - I contacted office and asked if dr Flakito Guzmán could put in referral if indeed pt was being ref  For anemia as per pt phone call

## 2021-03-10 NOTE — PROGRESS NOTES
[facility] - Hematology & Medical Oncology  Outpatient Visit Encounter Note      Victoria Case 28 y o  male 1985 [id number] Date:  3/15/2021    HEMATOLOGICAL HISTORY        Clotting History Denies   Bleeding History Denies   Cancer History Denies   Family Cancer History Denies   H/O Blood/Plt Transfusion Denies   Tobacco Use Denies   Alcohol Use One beer every couple of months while watching football   Occupation Pharmaceutical represenative       Flaquita Sanabria is a 28 y o  with HLD here for new consultation with me today  The patient is referred by PCP Dr Cynthia Miller and the reason for consultation is anemia  His most recent CBC has demonstrated microcytic hypochromic red blood cells without anemia, elevated RBC, as well as a stable WBC and platelet count:   01/65/2219 12/21/2019 2/16/2021   White Blood Cell Count 4 1 3 6 (L) 4 7   Red Blood Cell Count 5 84 (H) 5 98 (H) 6 24 (H)   Hemoglobin 13 1 (L) 13 1 (L) 14 0    HCT 42 7 42 4 45 9   MCV 73 1 (L) 70 9 (L) 73 6 (L)   MCH 22 4 (L) 21 9 (L) 22 4 (L)   MCHC  30 7 (L) 30 9 (L) 30 5 (L)   RDW 13 7 13 4 15 8 (H)   Platelet Count 511 089 278   Neutrophils 43 5 34 3 35 6   Lymphocytes 44 9 52 6 50 6   Monocytes 8 4 8 3 8 5   Eosinophils 2 7 4 2 4 7   Basophils PCT 0 5 0 6 0 6   Neutrophils (Absolute) 1,784 1,235 (L) 1,673   Lymphocytes (Absolute) 1,841 1,894 2,378   Monocytes (Absolute) 344 299 400   Eosinophils (Absolute) 111 151 221   Basophils ABS 21 22 28       He is here by himself today  He voices no acute complaints  He tells me that he was diagnosed with sickle cell trait 12 years ago  He exercises 6 days a week but notes increased tiredness recently which he attributes to lack of sleep  He has a 11 month old son and notes that his tiredness started 4 months ago especially when his son wasn't sleeping as much  He denies ever being told that he or a family member is diagnosed with thalassemia   He denies history of gastric bypass  He denies prior colonoscopy  He denies coffee ground stools, tarry stools, bright red blood per rectum, or hematuria  He denies pagophagia, restless leg syndrome, brittle nails, thinning hair, or pallor  He denies fatigue, lightheadedness, dizziness, shortness of breath, SESAY, palpitations, or chest pain  He denies fevers, chills, unintentional weight loss, abdominal pain/distension, nausea, vomiting, constipation, diarrhea, petechiae/purpura, unexplained ecchymosis, or LE swelling  I have reviewed the relevant past medical, surgical, social and family history  I have also reviewed allergies and medications for this patient  Review of Systems  Review of Systems   All other systems reviewed and are negative  OBJECTIVE     Physical Exam  Vitals:    03/15/21 0906   BP: 160/94   BP Location: Right arm   Patient Position: Sitting   Cuff Size: Adult   Pulse: 87   Resp: 18   Temp: 98 °F (36 7 °C)   TempSrc: Tympanic   SpO2: 98%   Weight: 87 5 kg (193 lb)   Height: 5' 7" (1 702 m)       Physical Exam  Vitals signs reviewed  Constitutional:       General: He is not in acute distress  Appearance: Normal appearance  He is normal weight  He is not ill-appearing, toxic-appearing or diaphoretic  Comments: Pleasant 42-year-old [facility] [employer] male  HENT:      Head: Normocephalic and atraumatic  Eyes:      General: No scleral icterus  Extraocular Movements: Extraocular movements intact  Conjunctiva/sclera: Conjunctivae normal       Pupils: Pupils are equal, round, and reactive to light  Neck:      Musculoskeletal: Normal range of motion and neck supple  No neck rigidity or muscular tenderness  Cardiovascular:      Rate and Rhythm: Normal rate and regular rhythm  Pulses: Normal pulses  Heart sounds: Normal heart sounds  Pulmonary:      Effort: Pulmonary effort is normal       Breath sounds: Normal breath sounds     Abdominal:      General: Abdomen is flat  Bowel sounds are normal  There is no distension  Palpations: Abdomen is soft  Tenderness: There is no abdominal tenderness  There is no guarding or rebound  Musculoskeletal: Normal range of motion  General: No swelling or tenderness  Right lower leg: No edema  Left lower leg: No edema  Lymphadenopathy:      Cervical: No cervical adenopathy  Skin:     General: Skin is warm and dry  Coloration: Skin is not jaundiced or pale  Findings: No bruising, erythema or rash  Neurological:      General: No focal deficit present  Mental Status: He is alert  Mental status is at baseline  Psychiatric:         Mood and Affect: Mood normal          Behavior: Behavior normal           Imaging  Relevant imaging reviewed in chart    Labs  Relevant labs reviewed in chart     ASSESSMENT & PLAN      Diagnosis ICD-10-CM Associated Orders   1  Microcytosis  R71 8 Hgb Fractionation [place]ascade     Iron Panel (Includes Ferritin, Iron Sat%, Iron, and TIBC)   2  History of anemia  Z86 2 Hgb Fractionation [place]     Iron Panel (Includes Ferritin, Iron Sat%, Iron, and TIBC)   3  Anemia, unspecified type  D64 9 Ambulatory referral to Hematology / Oncology       · Alannah Hays reported being diagnosed with sickle cell trait  He is currently hemodynamically stable with no history of sickle cell crisis or evidence of hemolytic anemia  He also does not exhibit symptoms of anemia  I reviewed with him the clinical implications of sickle cell trait, which include monitoring and recommendation of continued hydration especially in instances of extreme emotional or physical stress   Given his microcytosis and elevated RBC count, I am concerned that he may have thalassemia  I extensively reviewed the pathophysiology, management, and implications on quality of life if he were to be diagnosed with thalassemia  I reviewed that he would likely be a carrier given that he has mostly been asymptomatic  Thus, I have ordered hemoglobin electrophoresis to further investigate this  This may also help us to confirm his sickle cell trait which there is no evidence of documentation on, as well as allow for more information for whether or not his  son has a risk of inheriting this   o He was told in the past that he was anemic and has been taking oral iron supplementation for it  He has been tolerating it well and does not complain of constipation or GI distress  I recommended continuation of his low dose daily oral iron supplementation as he has not had any adverse effects   o I also ordered an iron panel to assess his ferritin levels and confirm that his microcytosis is not due to iron deficiency  Follow Up   Follow up in around 2 weeks in the evening on   All questions were answered to the patient's satisfaction during this encounter  They appreciated and thanked me for spending time with them  The patient knows the contact information for our office and know to reach out for any relevant concerns related to this encounter  For all other listed problems and medical diagnosis in his chart - they are managed by PCP and/or other specialists, which patient acknowledges        Yojana Howard PA-C  Hematology & Medical Oncology

## 2021-03-15 ENCOUNTER — LAB (OUTPATIENT)
Dept: LAB | Facility: MEDICAL CENTER | Age: 36
End: 2021-03-15
Payer: COMMERCIAL

## 2021-03-15 ENCOUNTER — CONSULT (OUTPATIENT)
Dept: HEMATOLOGY ONCOLOGY | Facility: CLINIC | Age: 36
End: 2021-03-15
Payer: COMMERCIAL

## 2021-03-15 VITALS
DIASTOLIC BLOOD PRESSURE: 94 MMHG | HEART RATE: 87 BPM | RESPIRATION RATE: 18 BRPM | TEMPERATURE: 98 F | WEIGHT: 193 LBS | SYSTOLIC BLOOD PRESSURE: 160 MMHG | BODY MASS INDEX: 30.29 KG/M2 | HEIGHT: 67 IN | OXYGEN SATURATION: 98 %

## 2021-03-15 DIAGNOSIS — Z86.2 HISTORY OF ANEMIA: ICD-10-CM

## 2021-03-15 DIAGNOSIS — R71.8 MICROCYTOSIS: Primary | ICD-10-CM

## 2021-03-15 DIAGNOSIS — R71.8 MICROCYTOSIS: ICD-10-CM

## 2021-03-15 DIAGNOSIS — E66.3 OVERWEIGHT (BMI 25.0-29.9): ICD-10-CM

## 2021-03-15 DIAGNOSIS — D64.9 ANEMIA, UNSPECIFIED TYPE: ICD-10-CM

## 2021-03-15 DIAGNOSIS — Z00.00 HEALTH CARE MAINTENANCE: ICD-10-CM

## 2021-03-15 DIAGNOSIS — E55.9 VITAMIN D DEFICIENCY: ICD-10-CM

## 2021-03-15 DIAGNOSIS — E78.5 HYPERLIPIDEMIA, UNSPECIFIED HYPERLIPIDEMIA TYPE: ICD-10-CM

## 2021-03-15 LAB
FERRITIN SERPL-MCNC: 204 NG/ML (ref 8–388)
IRON SATN MFR SERPL: 20 %
IRON SERPL-MCNC: 64 UG/DL (ref 65–175)
TIBC SERPL-MCNC: 315 UG/DL (ref 250–450)

## 2021-03-15 PROCEDURE — 83550 IRON BINDING TEST: CPT

## 2021-03-15 PROCEDURE — 83020 HEMOGLOBIN ELECTROPHORESIS: CPT

## 2021-03-15 PROCEDURE — 1036F TOBACCO NON-USER: CPT | Performed by: STUDENT IN AN ORGANIZED HEALTH CARE EDUCATION/TRAINING PROGRAM

## 2021-03-15 PROCEDURE — 99244 OFF/OP CNSLTJ NEW/EST MOD 40: CPT | Performed by: STUDENT IN AN ORGANIZED HEALTH CARE EDUCATION/TRAINING PROGRAM

## 2021-03-15 PROCEDURE — 3008F BODY MASS INDEX DOCD: CPT | Performed by: STUDENT IN AN ORGANIZED HEALTH CARE EDUCATION/TRAINING PROGRAM

## 2021-03-15 PROCEDURE — 83540 ASSAY OF IRON: CPT

## 2021-03-15 PROCEDURE — 36415 COLL VENOUS BLD VENIPUNCTURE: CPT

## 2021-03-15 PROCEDURE — 82728 ASSAY OF FERRITIN: CPT

## 2021-03-15 PROCEDURE — 81257 HBA1/HBA2 GENE: CPT

## 2021-03-15 RX ORDER — DIPHENOXYLATE HYDROCHLORIDE AND ATROPINE SULFATE 2.5; .025 MG/1; MG/1
1 TABLET ORAL DAILY
COMMUNITY

## 2021-03-16 NOTE — RESULT ENCOUNTER NOTE
Iron panel shows borderline low serum iron, but sufficient ferritin and TIBC WNL, demonstrating that his mildly low hemoglobin is not caused by iron deficiency  His hemoglobin electrophoresis is still pending to evaluate for thalassemia  He will be following up with me on 4/2 to discuss his results

## 2021-03-17 LAB
HGB A MFR BLD: 2.3 % (ref 1.8–3.2)
HGB A MFR BLD: 97.7 % (ref 96.4–98.8)
HGB F MFR BLD: 0 % (ref 0–2)
HGB FRACT BLD-IMP: NORMAL
HGB S MFR BLD: 0 %

## 2021-03-18 ENCOUNTER — TELEPHONE (OUTPATIENT)
Dept: HEMATOLOGY ONCOLOGY | Facility: CLINIC | Age: 36
End: 2021-03-18

## 2021-03-18 DIAGNOSIS — R71.8 MICROCYTOSIS: Primary | ICD-10-CM

## 2021-03-18 DIAGNOSIS — D64.9 ANEMIA, UNSPECIFIED TYPE: ICD-10-CM

## 2021-03-18 NOTE — TELEPHONE ENCOUNTER
Called [facility] and was redirected to [facility] ER services and spoke with Barney Wilcox ([phone]) Per Narinder Easley PA-C we wanted to see if we could add a lab test (alpha thalassemia) onto his lab work that was performed on 3/15/21   Barney Wilcox proclaimed she will process this request and we will receive a fax which will tell us whether or not the blood specimen is enough to process the alpha thalassemia lab test

## 2021-03-31 PROBLEM — D56.3 ALPHA THALASSEMIA MINOR: Status: ACTIVE | Noted: 2021-03-31

## 2021-03-31 LAB — MISCELLANEOUS LAB TEST RESULT: NORMAL

## 2021-03-31 NOTE — PROGRESS NOTES
[address] [facility] - Hematology & Medical Oncology  Outpatient Visit Encounter Note      Lisy Shoulder 28 y o  male 1985 [id number] Date:  4/2/2021    HEMATOLOGICAL HISTORY        Clotting History Denies   Bleeding History Denies   Cancer History Denies   Family Cancer History Denies   H/O Blood/Plt Transfusion Denies   Tobacco Use Denies   Alcohol Use One beer every couple of months while watching football   Occupation Pharmaceutical represenative       Alvin Marr is a 28 y o  with HLD here for follow up with me today for anemia  The patient is referred by PCP Dr Rissa Curtis  His most recent CBC has demonstrated microcytic hypochromic red blood cells without anemia, elevated RBC, as well as a stable WBC and platelet count:   11/75/6881 12/21/2019 2/16/2021   White Blood Cell Count 4 1 3 6 (L) 4 7   Red Blood Cell Count 5 84 (H) 5 98 (H) 6 24 (H)   Hemoglobin 13 1 (L) 13 1 (L) 14 0    HCT 42 7 42 4 45 9   MCV 73 1 (L) 70 9 (L) 73 6 (L)   MCH 22 4 (L) 21 9 (L) 22 4 (L)   MCHC  30 7 (L) 30 9 (L) 30 5 (L)   RDW 13 7 13 4 15 8 (H)   Platelet Count 021 780 278   Neutrophils 43 5 34 3 35 6   Lymphocytes 44 9 52 6 50 6   Monocytes 8 4 8 3 8 5   Eosinophils 2 7 4 2 4 7   Basophils PCT 0 5 0 6 0 6   Neutrophils (Absolute) 1,784 1,235 (L) 1,673   Lymphocytes (Absolute) 1,841 1,894 2,378   Monocytes (Absolute) 344 299 400   Eosinophils (Absolute) 111 151 221   Basophils ABS 21 22 28     His hemoglobin electrophoresis showed that he does not have beta thalassemia  However, his labs for alpha thalassemia revealed an "alpha3 7 and alpha3 7" mutation, demonstrating that he has alpha-thalassemia minor  3/15/2021    HEMOGLOBIN S QUANTITATION 0 0   HEMOGLOBIN A2 QUANTITATION 2 3   HEMOGLOBIN F QUANTITATION 0 0   HGB A 97 7     He denied ever being told that he or a family member is diagnosed with thalassemia   He does not have any history of gastric bypass or indication for colonoscopy  His iron showed mildly low serum iron, but sufficient iron stores:   3/15/2021    Iron 64 (L)   Ferritin 204   Iron Saturation 20   TIBC 315     Visit Today  He is here by himself today  He voices no acute complaints  He tells me that he was diagnosed with sickle cell trait 12 years ago  However, given his hemoglobin electrophoresis results, he admits that he may have been told he had a type of thalassemia minor instead of sickle cell trait  He continues to exercise regularly every week and notes decreased tiredness because  son has been staying asleep longer and Iliana Devine is able to sleep 5-6 hours nightly as a result  His son is now 10months-old  Iliana Devine had noted that his tiredness started 5 months ago especially when his son wasn't sleeping as much  He denies coffee ground stools, tarry stools, bright red blood per rectum, or hematuria  He denies pagophagia, restless leg syndrome, brittle nails, thinning hair, or pallor  He denies fatigue, lightheadedness, dizziness, shortness of breath, SESAY, palpitations, or chest pain  He denies fevers, chills, unintentional weight loss, abdominal pain/distension, nausea, vomiting, constipation, diarrhea, petechiae/purpura, unexplained ecchymosis, or LE swelling  I have reviewed the relevant past medical, surgical, social and family history  I have also reviewed allergies and medications for this patient  Review of Systems  Review of Systems   All other systems reviewed and are negative  OBJECTIVE     Physical Exam  Vitals:    21 0905   BP: 134/60   BP Location: Left arm   Patient Position: Sitting   Cuff Size: Adult   Pulse: 85   Resp: 18   Temp: (!) 97 1 °F (36 2 °C)   TempSrc: Tympanic   SpO2: 98%   Weight: 86 5 kg (190 lb 9 6 oz)   Height: 5' 7" (1 702 m)       Physical Exam  Vitals signs reviewed  Constitutional:       General: He is not in acute distress  Appearance: Normal appearance  He is normal weight   He is not ill-appearing, toxic-appearing or diaphoretic  Comments: Pleasant, well-appearing 28-year-old male sitting comfortably on the exam table  HENT:      Head: Normocephalic and atraumatic  Eyes:      Extraocular Movements: Extraocular movements intact  Conjunctiva/sclera: Conjunctivae normal       Pupils: Pupils are equal, round, and reactive to light  Comments: No conjunctival pallor present  Neck:      Musculoskeletal: Normal range of motion and neck supple  No neck rigidity or muscular tenderness  Cardiovascular:      Rate and Rhythm: Normal rate and regular rhythm  Pulses: Normal pulses  Heart sounds: Normal heart sounds  No murmur  No friction rub  No gallop  Pulmonary:      Effort: Pulmonary effort is normal  No respiratory distress  Breath sounds: Normal breath sounds  No wheezing or rales  Abdominal:      General: Abdomen is flat  Bowel sounds are normal  There is no distension  Palpations: Abdomen is soft  Tenderness: There is no abdominal tenderness  There is no guarding  Musculoskeletal: Normal range of motion  Right lower leg: No edema  Left lower leg: No edema  Lymphadenopathy:      Cervical: No cervical adenopathy  Skin:     General: Skin is warm and dry  Coloration: Skin is not jaundiced or pale  Findings: No bruising, erythema or rash  Neurological:      General: No focal deficit present  Mental Status: He is alert and oriented to person, place, and time  Mental status is at baseline  Cranial Nerves: No cranial nerve deficit  Motor: No weakness  Psychiatric:         Mood and Affect: Mood normal          Behavior: Behavior normal           Imaging  Relevant imaging reviewed in chart    Labs  Relevant labs reviewed in chart     3/15 Hemoglobin electrophoresis result:  Normal hemoglobin present; no hemoglobin variant or thalassemia   observed  ASSESSMENT & PLAN      Diagnosis ICD-10-CM Associated Orders   1  Alpha thalassemia minor  D56 3      I discussed Lux's case with Dr Yovani Jones and we formulated the plan together   His microcytosis and elevated RBC count can now be explained with his diagnosis of alpha thalassemia minor  o I extensively reviewed the pathophysiology, management, and implications on quality of life with his diagnosis of thalassemia  I provided paper information regarding his diagnosis, and reviewed that his lab work will likely demonstrate microcytosis, hypochromia and mild anemia (if any) for future encounters with medical providers  I explained that Alpha thalassemia minor patients typically exhibit no symptoms or decreased endurance with extreme sports (at most)  Thus, if he develops fatigue, shortness of breath, lightheadedness, dizziness, chest pain, or headache, he should see a medical provider  We discussed that his improved energy level is likely due to increased sleep because his 10month-old son has been staying asleep for longer, and not due to any hematologic disorder  He is currently hemodynamically stable and does not exhibit symptoms of anemia   o I also reviewed that his wife can be tested for thalassemia to assess for risk of his son inheriting thalassemia from them   o I explained that he can be adequately monitored by his PCP with routine blood work  Thus, there is no need to follow up with our department further  I also explained that he is welcome to make an appointment with our team if he develops abnormal blood work or contact us if he has any further questions  o His iron panel demonstrated adequate ferritin levels with borderline low serum iron and confirmed that his microcytosis is not due to iron deficiency  - He was told in the past that he was anemic and has been taking oral iron supplementation for it  He has been tolerating it well and does not complain of constipation or GI distress   I recommended continuation of his low dose daily oral iron supplementation as he has not had any adverse effects  · Soumya Aguilar reported being diagnosed with sickle cell trait  However, his hemoglobin electrophoresis demonstrated normal hemoglobin without any hemoglobin variant or beta thalassemia  Given that he had zero hemoglobin S, he likely does not have sickle cell trait or disease  This confirms that his  son likely does not have a risk of inheriting sickle cell trait from him  Follow Up   None needed  All questions were answered to the patient's satisfaction during this encounter  They appreciated and thanked me for spending time with them  The patient knows the contact information for our office and know to reach out for any relevant concerns related to this encounter  For all other listed problems and medical diagnosis in his chart - they are managed by PCP and/or other specialists, which patient acknowledges        Augusto Rodrigez PA-C  Hematology & Medical Oncology

## 2021-04-02 ENCOUNTER — OFFICE VISIT (OUTPATIENT)
Dept: HEMATOLOGY ONCOLOGY | Facility: CLINIC | Age: 36
End: 2021-04-02
Payer: COMMERCIAL

## 2021-04-02 VITALS
WEIGHT: 190.6 LBS | DIASTOLIC BLOOD PRESSURE: 60 MMHG | HEART RATE: 85 BPM | BODY MASS INDEX: 29.91 KG/M2 | SYSTOLIC BLOOD PRESSURE: 134 MMHG | HEIGHT: 67 IN | TEMPERATURE: 97.1 F | RESPIRATION RATE: 18 BRPM | OXYGEN SATURATION: 98 %

## 2021-04-02 DIAGNOSIS — D56.3 ALPHA THALASSEMIA MINOR: Primary | ICD-10-CM

## 2021-04-02 PROCEDURE — 3008F BODY MASS INDEX DOCD: CPT | Performed by: STUDENT IN AN ORGANIZED HEALTH CARE EDUCATION/TRAINING PROGRAM

## 2021-04-02 PROCEDURE — 1036F TOBACCO NON-USER: CPT | Performed by: STUDENT IN AN ORGANIZED HEALTH CARE EDUCATION/TRAINING PROGRAM

## 2021-04-02 PROCEDURE — 99214 OFFICE O/P EST MOD 30 MIN: CPT | Performed by: STUDENT IN AN ORGANIZED HEALTH CARE EDUCATION/TRAINING PROGRAM

## 2022-02-04 ENCOUNTER — OFFICE VISIT (OUTPATIENT)
Dept: FAMILY MEDICINE CLINIC | Facility: CLINIC | Age: 37
End: 2022-02-04
Payer: COMMERCIAL

## 2022-02-04 VITALS
RESPIRATION RATE: 16 BRPM | HEART RATE: 96 BPM | SYSTOLIC BLOOD PRESSURE: 138 MMHG | TEMPERATURE: 97.6 F | OXYGEN SATURATION: 96 % | WEIGHT: 188.6 LBS | HEIGHT: 67 IN | BODY MASS INDEX: 29.6 KG/M2 | DIASTOLIC BLOOD PRESSURE: 80 MMHG

## 2022-02-04 DIAGNOSIS — D56.3 ALPHA THALASSEMIA MINOR: ICD-10-CM

## 2022-02-04 DIAGNOSIS — Z00.00 HEALTH CARE MAINTENANCE: Primary | ICD-10-CM

## 2022-02-04 DIAGNOSIS — D64.9 ANEMIA, UNSPECIFIED TYPE: ICD-10-CM

## 2022-02-04 DIAGNOSIS — E61.1 IRON DEFICIENCY: ICD-10-CM

## 2022-02-04 DIAGNOSIS — E66.3 OVERWEIGHT (BMI 25.0-29.9): ICD-10-CM

## 2022-02-04 DIAGNOSIS — E78.5 HYPERLIPIDEMIA, UNSPECIFIED HYPERLIPIDEMIA TYPE: ICD-10-CM

## 2022-02-04 PROCEDURE — 99395 PREV VISIT EST AGE 18-39: CPT | Performed by: FAMILY MEDICINE

## 2022-02-04 PROCEDURE — 1036F TOBACCO NON-USER: CPT | Performed by: FAMILY MEDICINE

## 2022-02-04 PROCEDURE — 3008F BODY MASS INDEX DOCD: CPT | Performed by: FAMILY MEDICINE

## 2022-02-04 NOTE — PROGRESS NOTES
Assessment/Plan:   Chief Complaint   Patient presents with    Physical Exam    Joint Swelling     right knee stiffness for past year      Patient Instructions   Here for general PE and doing well, needs to get BMI closer to 25 or lower  Low cholesterol diet encouraged and rec rechecking labs for hx of anemia and elevated ldl  Follow CDC guidelines for covid 19 prevention  Saw Heme/Onc in past for his anemia and alpha thalassemia  Covid vaccinated  If right knee stiffness continues call office and rec orthopedics consult  Currently no pain  No problem-specific Assessment & Plan notes found for this encounter  Diagnoses and all orders for this visit:    Health care maintenance  -     Comprehensive metabolic panel; Future  -     Lipid Panel with Direct LDL reflex; Future  -     CBC and differential; Future    Alpha thalassemia minor  -     CBC and differential; Future    Overweight (BMI 25 0-29 9)  -     Comprehensive metabolic panel; Future  -     Lipid Panel with Direct LDL reflex; Future    Hyperlipidemia, unspecified hyperlipidemia type  -     Comprehensive metabolic panel; Future  -     Lipid Panel with Direct LDL reflex; Future    Anemia, unspecified type  -     CBC and differential; Future  -     Ferritin; Future    Iron deficiency  -     Iron; Future  -     Ferritin; Future          Subjective:      Patient ID: Chyna Garsia is a 39 y o  male  Physical Exam  Joint Swelling (right knee stiffness for past year )    Patient has seen heme/onc in past and has some iron def and takes low dose iron  Has hx of alpha thalassemia minor  Hx of elevated ldl  Patient with hx of being overweight  The following portions of the patient's history were reviewed and updated as appropriate: allergies, current medications, past family history, past medical history, past social history, past surgical history and problem list     Review of Systems   Constitutional: Negative  HENT: Negative      Eyes: Negative  Respiratory: Negative  Cardiovascular: Negative  Gastrointestinal: Negative  Endocrine: Negative  Genitourinary: Negative  Musculoskeletal: Negative  Skin: Negative  Allergic/Immunologic: Negative  Neurological: Negative  Hematological: Negative  Psychiatric/Behavioral: Negative  Objective:      /80   Pulse 96   Temp 97 6 °F (36 4 °C) (Temporal)   Resp 16   Ht 5' 7" (1 702 m)   Wt 85 5 kg (188 lb 9 6 oz)   SpO2 96%   BMI 29 54 kg/m²          Physical Exam  Constitutional:       Appearance: He is well-developed  HENT:      Head: Normocephalic and atraumatic  Right Ear: External ear normal       Left Ear: External ear normal    Eyes:      Conjunctiva/sclera: Conjunctivae normal       Pupils: Pupils are equal, round, and reactive to light  Cardiovascular:      Rate and Rhythm: Normal rate and regular rhythm  Heart sounds: Normal heart sounds  Pulmonary:      Effort: Pulmonary effort is normal       Breath sounds: Normal breath sounds  Abdominal:      General: Abdomen is flat  Bowel sounds are normal       Palpations: Abdomen is soft  Genitourinary:     Penis: Normal        Testes: Normal    Musculoskeletal:         General: Normal range of motion  Cervical back: Normal range of motion and neck supple  Skin:     General: Skin is warm and dry  Neurological:      Mental Status: He is alert and oriented to person, place, and time  Deep Tendon Reflexes: Reflexes are normal and symmetric     Psychiatric:         Behavior: Behavior normal

## 2022-02-04 NOTE — PATIENT INSTRUCTIONS
Here for general PE and doing well, needs to get BMI closer to 25 or lower  Low cholesterol diet encouraged and rec rechecking labs for hx of anemia and elevated ldl  Follow CDC guidelines for covid 19 prevention  Saw Heme/Onc in past for his anemia and alpha thalassemia  Covid vaccinated  If right knee stiffness continues call office and rec orthopedics consult  Currently no pain

## 2022-02-10 LAB
ALBUMIN SERPL-MCNC: 4.7 G/DL (ref 3.6–5.1)
ALBUMIN/GLOB SERPL: 1.9 (CALC) (ref 1–2.5)
ALP SERPL-CCNC: 49 U/L (ref 36–130)
ALT SERPL-CCNC: 11 U/L (ref 9–46)
AST SERPL-CCNC: 14 U/L (ref 10–40)
BASOPHILS # BLD AUTO: 20 CELLS/UL (ref 0–200)
BASOPHILS NFR BLD AUTO: 0.6 %
BILIRUB SERPL-MCNC: 0.6 MG/DL (ref 0.2–1.2)
BUN SERPL-MCNC: 12 MG/DL (ref 7–25)
BUN/CREAT SERPL: NORMAL (CALC) (ref 6–22)
CALCIUM SERPL-MCNC: 9.8 MG/DL (ref 8.6–10.3)
CHLORIDE SERPL-SCNC: 102 MMOL/L (ref 98–110)
CHOLEST SERPL-MCNC: 238 MG/DL
CHOLEST/HDLC SERPL: 4.3 (CALC)
CO2 SERPL-SCNC: 28 MMOL/L (ref 20–32)
CREAT SERPL-MCNC: 1.06 MG/DL (ref 0.6–1.35)
EOSINOPHIL # BLD AUTO: 59 CELLS/UL (ref 15–500)
EOSINOPHIL NFR BLD AUTO: 1.8 %
ERYTHROCYTE [DISTWIDTH] IN BLOOD BY AUTOMATED COUNT: 14.4 % (ref 11–15)
FERRITIN SERPL-MCNC: 240 NG/ML (ref 38–380)
GLOBULIN SER CALC-MCNC: 2.5 G/DL (CALC) (ref 1.9–3.7)
GLUCOSE SERPL-MCNC: 82 MG/DL (ref 65–99)
HCT VFR BLD AUTO: 44.8 % (ref 38.5–50)
HDLC SERPL-MCNC: 55 MG/DL
HGB BLD-MCNC: 13.8 G/DL (ref 13.2–17.1)
IRON SERPL-MCNC: 105 MCG/DL (ref 50–180)
LDLC SERPL CALC-MCNC: 168 MG/DL (CALC)
LYMPHOCYTES # BLD AUTO: 1670 CELLS/UL (ref 850–3900)
LYMPHOCYTES NFR BLD AUTO: 50.6 %
MCH RBC QN AUTO: 22.2 PG (ref 27–33)
MCHC RBC AUTO-ENTMCNC: 30.8 G/DL (ref 32–36)
MCV RBC AUTO: 72 FL (ref 80–100)
MONOCYTES # BLD AUTO: 281 CELLS/UL (ref 200–950)
MONOCYTES NFR BLD AUTO: 8.5 %
NEUTROPHILS # BLD AUTO: 1271 CELLS/UL (ref 1500–7800)
NEUTROPHILS NFR BLD AUTO: 38.5 %
NONHDLC SERPL-MCNC: 183 MG/DL (CALC)
PLATELET # BLD AUTO: 287 THOUSAND/UL (ref 140–400)
PMV BLD REES-ECKER: 10.5 FL (ref 7.5–12.5)
POTASSIUM SERPL-SCNC: 4.1 MMOL/L (ref 3.5–5.3)
PROT SERPL-MCNC: 7.2 G/DL (ref 6.1–8.1)
RBC # BLD AUTO: 6.22 MILLION/UL (ref 4.2–5.8)
SL AMB EGFR AFRICAN AMERICAN: 104 ML/MIN/1.73M2
SL AMB EGFR NON AFRICAN AMERICAN: 90 ML/MIN/1.73M2
SODIUM SERPL-SCNC: 138 MMOL/L (ref 135–146)
TRIGL SERPL-MCNC: 62 MG/DL
WBC # BLD AUTO: 3.3 THOUSAND/UL (ref 3.8–10.8)

## 2022-08-30 ENCOUNTER — OFFICE VISIT (OUTPATIENT)
Dept: FAMILY MEDICINE CLINIC | Facility: CLINIC | Age: 37
End: 2022-08-30
Payer: COMMERCIAL

## 2022-08-30 VITALS
RESPIRATION RATE: 18 BRPM | BODY MASS INDEX: 29.6 KG/M2 | WEIGHT: 188.6 LBS | DIASTOLIC BLOOD PRESSURE: 80 MMHG | TEMPERATURE: 97.3 F | HEIGHT: 67 IN | SYSTOLIC BLOOD PRESSURE: 124 MMHG | OXYGEN SATURATION: 99 % | HEART RATE: 67 BPM

## 2022-08-30 DIAGNOSIS — E78.5 HYPERLIPIDEMIA, UNSPECIFIED HYPERLIPIDEMIA TYPE: ICD-10-CM

## 2022-08-30 DIAGNOSIS — R06.02 SOB (SHORTNESS OF BREATH): ICD-10-CM

## 2022-08-30 DIAGNOSIS — R20.2 NUMBNESS AND TINGLING OF BOTH LOWER EXTREMITIES: ICD-10-CM

## 2022-08-30 DIAGNOSIS — Z20.822 ENCOUNTER FOR LABORATORY TESTING FOR COVID-19 VIRUS: ICD-10-CM

## 2022-08-30 DIAGNOSIS — R20.0 NUMBNESS AND TINGLING OF BOTH LOWER EXTREMITIES: ICD-10-CM

## 2022-08-30 DIAGNOSIS — F41.9 ANXIETY: Primary | ICD-10-CM

## 2022-08-30 PROCEDURE — U0003 INFECTIOUS AGENT DETECTION BY NUCLEIC ACID (DNA OR RNA); SEVERE ACUTE RESPIRATORY SYNDROME CORONAVIRUS 2 (SARS-COV-2) (CORONAVIRUS DISEASE [COVID-19]), AMPLIFIED PROBE TECHNIQUE, MAKING USE OF HIGH THROUGHPUT TECHNOLOGIES AS DESCRIBED BY CMS-2020-01-R: HCPCS | Performed by: FAMILY MEDICINE

## 2022-08-30 PROCEDURE — 99214 OFFICE O/P EST MOD 30 MIN: CPT | Performed by: FAMILY MEDICINE

## 2022-08-30 PROCEDURE — U0005 INFEC AGEN DETEC AMPLI PROBE: HCPCS | Performed by: FAMILY MEDICINE

## 2022-08-30 NOTE — PATIENT INSTRUCTIONS
Here for numbness and tingling and dizziness and sob after playing basketball  Rec also to get labs as directed for hyperlipidemia and also to get covid test as directed  Stay well hydrated and the symptoms may be related to stress and anxiety

## 2022-08-30 NOTE — PROGRESS NOTES
Assessment/Plan:  Chief Complaint   Patient presents with    Dizziness     light headness, blurry vision  more than usually trouble breathing at the gym last Wednesday pt was at gym pt states have had  palpitations since last Wednesday pt went to Urgent care on Thursday had EKG done and it was normal       Patient Instructions   Here for numbness and tingling         No problem-specific Assessment & Plan notes found for this encounter  Diagnoses and all orders for this visit:    Anxiety  -     Comprehensive metabolic panel; Future  -     CBC and differential; Future    Numbness and tingling of both lower extremities  -     Comprehensive metabolic panel; Future  -     CBC and differential; Future    SOB (shortness of breath)  -     Comprehensive metabolic panel; Future  -     CBC and differential; Future    Encounter for laboratory testing for COVID-19 virus    Hyperlipidemia, unspecified hyperlipidemia type  -     Lipid Panel with Direct LDL reflex; Future          Subjective:      Patient ID: Katie Schmitt is a 40 y o  male  Dizziness (light headness, blurry vision  more than usually trouble breathing at the gym last Wednesday pt was at gym pt states have had  palpitations since last Wednesday pt went to Urgent care on Thursday had EKG done and it was normal  ) patient has had some brain fog as well  Patient with no other symptoms  Stress and lack of sleep as well  Went to urgent care and had ekg and was told to do a holter monitor but did not  The following portions of the patient's history were reviewed and updated as appropriate: allergies, current medications, past family history, past medical history, past social history, past surgical history and problem list     Review of Systems   Constitutional: Negative  HENT: Negative  Eyes: Negative  Respiratory: Positive for shortness of breath  Cardiovascular: Negative  Gastrointestinal: Negative  Endocrine: Negative  Genitourinary: Negative  Musculoskeletal: Negative  Skin: Negative  Allergic/Immunologic: Negative  Neurological: Positive for dizziness and numbness  Hematological: Negative  Psychiatric/Behavioral: Negative  Objective:      /80 (BP Location: Left arm, Patient Position: Sitting, Cuff Size: Adult)   Pulse 67   Temp (!) 97 3 °F (36 3 °C) (Temporal)   Resp 18   Ht 5' 7" (1 702 m)   Wt 85 5 kg (188 lb 9 6 oz)   SpO2 99%   BMI 29 54 kg/m²          Physical Exam  Constitutional:       Appearance: He is well-developed  HENT:      Head: Normocephalic and atraumatic  Right Ear: External ear normal       Left Ear: External ear normal       Nose: Nose normal    Eyes:      Conjunctiva/sclera: Conjunctivae normal       Pupils: Pupils are equal, round, and reactive to light  Cardiovascular:      Rate and Rhythm: Normal rate and regular rhythm  Heart sounds: Normal heart sounds  Pulmonary:      Effort: Pulmonary effort is normal       Breath sounds: Normal breath sounds  Abdominal:      General: Abdomen is flat  Bowel sounds are normal       Palpations: Abdomen is soft  Musculoskeletal:         General: Normal range of motion  Cervical back: Normal range of motion and neck supple  Skin:     General: Skin is warm and dry  Neurological:      Mental Status: He is alert and oriented to person, place, and time  Deep Tendon Reflexes: Reflexes are normal and symmetric     Psychiatric:         Behavior: Behavior normal

## 2022-08-31 LAB — SARS-COV-2 RNA RESP QL NAA+PROBE: NEGATIVE

## 2022-09-16 LAB
ALBUMIN SERPL-MCNC: 4.6 G/DL (ref 3.6–5.1)
ALBUMIN/GLOB SERPL: 1.8 (CALC) (ref 1–2.5)
ALP SERPL-CCNC: 50 U/L (ref 36–130)
ALT SERPL-CCNC: 20 U/L (ref 9–46)
AST SERPL-CCNC: 16 U/L (ref 10–40)
BASOPHILS # BLD AUTO: 28 CELLS/UL (ref 0–200)
BASOPHILS NFR BLD AUTO: 0.6 %
BILIRUB SERPL-MCNC: 0.3 MG/DL (ref 0.2–1.2)
BUN SERPL-MCNC: 10 MG/DL (ref 7–25)
BUN/CREAT SERPL: NORMAL (CALC) (ref 6–22)
CALCIUM SERPL-MCNC: 9.6 MG/DL (ref 8.6–10.3)
CHLORIDE SERPL-SCNC: 104 MMOL/L (ref 98–110)
CHOLEST SERPL-MCNC: 214 MG/DL
CHOLEST/HDLC SERPL: 3.6 (CALC)
CO2 SERPL-SCNC: 30 MMOL/L (ref 20–32)
CREAT SERPL-MCNC: 1.09 MG/DL (ref 0.6–1.26)
EOSINOPHIL # BLD AUTO: 230 CELLS/UL (ref 15–500)
EOSINOPHIL NFR BLD AUTO: 5 %
ERYTHROCYTE [DISTWIDTH] IN BLOOD BY AUTOMATED COUNT: 14.4 % (ref 11–15)
GFR/BSA.PRED SERPLBLD CYS-BASED-ARV: 90 ML/MIN/1.73M2
GLOBULIN SER CALC-MCNC: 2.6 G/DL (CALC) (ref 1.9–3.7)
GLUCOSE SERPL-MCNC: 81 MG/DL (ref 65–99)
HCT VFR BLD AUTO: 47.2 % (ref 38.5–50)
HDLC SERPL-MCNC: 60 MG/DL
HGB BLD-MCNC: 14.6 G/DL (ref 13.2–17.1)
LDLC SERPL CALC-MCNC: 140 MG/DL (CALC)
LYMPHOCYTES # BLD AUTO: 2300 CELLS/UL (ref 850–3900)
LYMPHOCYTES NFR BLD AUTO: 50 %
MCH RBC QN AUTO: 22.7 PG (ref 27–33)
MCHC RBC AUTO-ENTMCNC: 30.9 G/DL (ref 32–36)
MCV RBC AUTO: 73.3 FL (ref 80–100)
MONOCYTES # BLD AUTO: 359 CELLS/UL (ref 200–950)
MONOCYTES NFR BLD AUTO: 7.8 %
NEUTROPHILS # BLD AUTO: 1684 CELLS/UL (ref 1500–7800)
NEUTROPHILS NFR BLD AUTO: 36.6 %
NONHDLC SERPL-MCNC: 154 MG/DL (CALC)
PLATELET # BLD AUTO: 312 THOUSAND/UL (ref 140–400)
PMV BLD REES-ECKER: 10.4 FL (ref 7.5–12.5)
POTASSIUM SERPL-SCNC: 4.2 MMOL/L (ref 3.5–5.3)
PROT SERPL-MCNC: 7.2 G/DL (ref 6.1–8.1)
RBC # BLD AUTO: 6.44 MILLION/UL (ref 4.2–5.8)
SODIUM SERPL-SCNC: 140 MMOL/L (ref 135–146)
TRIGL SERPL-MCNC: 52 MG/DL
WBC # BLD AUTO: 4.6 THOUSAND/UL (ref 3.8–10.8)

## 2023-02-10 ENCOUNTER — OFFICE VISIT (OUTPATIENT)
Dept: FAMILY MEDICINE CLINIC | Facility: CLINIC | Age: 38
End: 2023-02-10

## 2023-02-10 VITALS
OXYGEN SATURATION: 98 % | TEMPERATURE: 97.2 F | BODY MASS INDEX: 28.5 KG/M2 | WEIGHT: 181.6 LBS | HEIGHT: 67 IN | HEART RATE: 68 BPM | SYSTOLIC BLOOD PRESSURE: 124 MMHG | DIASTOLIC BLOOD PRESSURE: 78 MMHG | RESPIRATION RATE: 16 BRPM

## 2023-02-10 DIAGNOSIS — E78.5 HYPERLIPIDEMIA, UNSPECIFIED HYPERLIPIDEMIA TYPE: ICD-10-CM

## 2023-02-10 DIAGNOSIS — Z00.00 HEALTH CARE MAINTENANCE: Primary | ICD-10-CM

## 2023-02-10 DIAGNOSIS — E66.3 OVERWEIGHT (BMI 25.0-29.9): ICD-10-CM

## 2023-02-10 DIAGNOSIS — D56.3 ALPHA THALASSEMIA MINOR: ICD-10-CM

## 2023-02-10 NOTE — PATIENT INSTRUCTIONS
Here for General PE and needs updated labs and also needs to follow a low sugar low cholesterol diet and rec rechecking labs in March 2023 for rechecking lipids

## 2023-02-10 NOTE — PROGRESS NOTES
Name: Vivian Dumont      : 1985      MRN: [id number]  Encounter Provider: Whitley Ruiz DO  Encounter Date: 2/10/2023   Encounter department: [address] [place] [address]  Chief Complaint   Patient presents with   • Physical Exam     Yearly pe      Patient Instructions   Here for General PE and needs updated labs and also needs to follow a low sugar low cholesterol diet and rec rechecking labs in 2023 for rechecking lipids  Assessment & Plan     1  Health care maintenance  -     Comprehensive metabolic panel; Future  -     Lipid Panel with Direct LDL reflex; Future    2  Overweight (BMI 25 0-29 9)  -     Comprehensive metabolic panel; Future  -     Lipid Panel with Direct LDL reflex; Future    3  Hyperlipidemia, unspecified hyperlipidemia type  -     Comprehensive metabolic panel; Future  -     Lipid Panel with Direct LDL reflex; Future    4  Alpha thalassemia minor           Subjective      Physical Exam (Yearly pe )  and has 1 boy and wif is 15 weeks pregnant  No cp or sob, or ha  Losing weight and eating healthy and staying active  Review of Systems   Constitutional: Negative  HENT: Negative  Eyes: Negative  Respiratory: Negative  Cardiovascular: Negative  Gastrointestinal: Negative  Endocrine: Negative  Genitourinary: Negative  Musculoskeletal: Negative  Skin: Negative  Allergic/Immunologic: Negative  Neurological: Negative  Hematological: Negative  Psychiatric/Behavioral: Negative          Current Outpatient Medications on File Prior to Visit   Medication Sig   • Apple Cider Vinegar 188 MG CAPS Take 1 capsule by mouth daily   • Ascorbic Acid (VITAMIN C) 100 MG tablet Take 1 tablet by mouth daily   • Cholecalciferol (VITAMIN D3) 3000 units TABS Take 1 tablet by mouth daily   • ferrous sulfate 325 (65 Fe) mg tablet Take 325 mg by mouth daily with breakfast   • Flaxseed, Linseed, (FLAXSEED OIL) 1000 MG CAPS Take by mouth   • Loratadine (CLARITIN PO) Take by mouth daily   • multivitamin (THERAGRAN) TABS Take 1 tablet by mouth daily   • Omega-3 Fatty Acids (FISH OIL) 645 MG CAPS Take 1 capsule by mouth daily       Objective     /78 (BP Location: Left arm, Patient Position: Sitting, Cuff Size: Adult)   Pulse 68   Temp (!) 97 2 °F (36 2 °C) (Temporal)   Resp 16   Ht 5' 7" (1 702 m)   Wt 82 4 kg (181 lb 9 6 oz)   SpO2 98%   BMI 28 44 kg/m²     Physical Exam  Constitutional:       Appearance: He is well-developed  Comments: overweight   HENT:      Head: Normocephalic and atraumatic  Right Ear: Tympanic membrane, ear canal and external ear normal       Left Ear: Tympanic membrane, ear canal and external ear normal       Nose: Nose normal    Eyes:      Conjunctiva/sclera: Conjunctivae normal       Pupils: Pupils are equal, round, and reactive to light  Cardiovascular:      Rate and Rhythm: Normal rate and regular rhythm  Heart sounds: Normal heart sounds  Pulmonary:      Effort: Pulmonary effort is normal       Breath sounds: Normal breath sounds  Genitourinary:     Penis: Normal        Testes: Normal    Musculoskeletal:         General: Normal range of motion  Cervical back: Normal range of motion and neck supple  Skin:     General: Skin is warm and dry  Capillary Refill: Capillary refill takes less than 2 seconds  Neurological:      General: No focal deficit present  Mental Status: He is alert and oriented to person, place, and time  Deep Tendon Reflexes: Reflexes are normal and symmetric  Psychiatric:         Mood and Affect: Mood normal          Behavior: Behavior normal          Thought Content:  Thought content normal          Judgment: Judgment normal        Danni Lomas DO

## 2023-03-16 LAB
ALBUMIN SERPL-MCNC: 4.4 G/DL (ref 3.6–5.1)
ALBUMIN/GLOB SERPL: 1.8 (CALC) (ref 1–2.5)
ALP SERPL-CCNC: 45 U/L (ref 36–130)
ALT SERPL-CCNC: 13 U/L (ref 9–46)
AST SERPL-CCNC: 15 U/L (ref 10–40)
BILIRUB SERPL-MCNC: 0.4 MG/DL (ref 0.2–1.2)
BUN SERPL-MCNC: 10 MG/DL (ref 7–25)
BUN/CREAT SERPL: NORMAL (CALC) (ref 6–22)
CALCIUM SERPL-MCNC: 9.7 MG/DL (ref 8.6–10.3)
CHLORIDE SERPL-SCNC: 102 MMOL/L (ref 98–110)
CHOLEST SERPL-MCNC: 220 MG/DL
CHOLEST/HDLC SERPL: 3.9 (CALC)
CO2 SERPL-SCNC: 31 MMOL/L (ref 20–32)
CREAT SERPL-MCNC: 1.14 MG/DL (ref 0.6–1.26)
GFR/BSA.PRED SERPLBLD CYS-BASED-ARV: 85 ML/MIN/1.73M2
GLOBULIN SER CALC-MCNC: 2.5 G/DL (CALC) (ref 1.9–3.7)
GLUCOSE SERPL-MCNC: 84 MG/DL (ref 65–99)
HDLC SERPL-MCNC: 57 MG/DL
LDLC SERPL CALC-MCNC: 146 MG/DL (CALC)
NONHDLC SERPL-MCNC: 163 MG/DL (CALC)
POTASSIUM SERPL-SCNC: 4 MMOL/L (ref 3.5–5.3)
PROT SERPL-MCNC: 6.9 G/DL (ref 6.1–8.1)
SODIUM SERPL-SCNC: 140 MMOL/L (ref 135–146)
TRIGL SERPL-MCNC: 71 MG/DL

## 2023-05-04 ENCOUNTER — OFFICE VISIT (OUTPATIENT)
Dept: FAMILY MEDICINE CLINIC | Facility: CLINIC | Age: 38
End: 2023-05-04

## 2023-05-04 VITALS
BODY MASS INDEX: 28.97 KG/M2 | RESPIRATION RATE: 16 BRPM | HEART RATE: 72 BPM | TEMPERATURE: 96.3 F | HEIGHT: 67 IN | OXYGEN SATURATION: 98 % | WEIGHT: 184.6 LBS | SYSTOLIC BLOOD PRESSURE: 126 MMHG | DIASTOLIC BLOOD PRESSURE: 80 MMHG

## 2023-05-04 DIAGNOSIS — Z91.010 FOOD ALLERGY, PEANUT: ICD-10-CM

## 2023-05-04 DIAGNOSIS — J30.2 SEASONAL ALLERGIES: Primary | ICD-10-CM

## 2023-05-04 NOTE — PATIENT INSTRUCTIONS
Seasonal allergies and also has used antihistamines and recently cat and dog when at [employer]  Trial of zyrtec 10 mg prn seasonal allergies

## 2023-05-04 NOTE — PROGRESS NOTES
Name: James Bautista      : 1985      MRN: [id number]  Encounter Provider: Bradly Duque DO  Encounter Date: 2023   Encounter department: [address] [place] [address]  Chief Complaint   Patient presents with    Allergic Reaction     Pt has been having issues with allergies   Pt states he is allergic to cats and dogs      Patient Instructions   Seasonal allergies and also has used antihistamines and recently cat and dog when at [employer]  Trial of zyrtec 10 mg prn seasonal allergies  Assessment & Plan     1  Seasonal allergies  -     Ambulatory Referral to Allergy; Future    2  Food allergy, peanut           Subjective      Allergic Reaction (Pt has been having issues with allergies   Pt states he is allergic to cats and dogs )      Was on Claritin D for seasonal allergies  Also used Benadryl prn  Also in past possible food allergy - possibly peanut  Allergic Reaction      Review of Systems   Constitutional: Negative  HENT: Negative  Eyes: Negative  Respiratory: Negative  Cardiovascular: Negative  Gastrointestinal: Negative  Endocrine: Negative  Genitourinary: Negative  Musculoskeletal: Negative  Skin: Negative  Allergic/Immunologic: Negative  Neurological: Negative  Hematological: Negative  Psychiatric/Behavioral: Negative          Current Outpatient Medications on File Prior to Visit   Medication Sig    Apple Cider Vinegar 188 MG CAPS Take 1 capsule by mouth daily    Ascorbic Acid (VITAMIN C) 100 MG tablet Take 1 tablet by mouth daily    Cholecalciferol (VITAMIN D3) 3000 units TABS Take 1 tablet by mouth daily    ferrous sulfate 325 (65 Fe) mg tablet Take 325 mg by mouth daily with breakfast    Flaxseed, Linseed, (FLAXSEED OIL) 1000 MG CAPS Take by mouth    Loratadine (CLARITIN PO) Take by mouth daily    multivitamin (THERAGRAN) TABS Take 1 tablet by mouth daily    Omega-3 Fatty Acids (FISH OIL) 645 MG CAPS Take 1 capsule by "mouth daily       Objective     /80 (BP Location: Left arm, Patient Position: Sitting, Cuff Size: Large)   Pulse 72   Temp (!) 96 3 °F (35 7 °C) (Temporal)   Resp 16   Ht 5' 7\" (1 702 m)   Wt 83 7 kg (184 lb 9 6 oz)   SpO2 98%   BMI 28 91 kg/m²     Physical Exam  Constitutional:       Appearance: He is well-developed  HENT:      Head: Normocephalic and atraumatic  Right Ear: External ear normal       Left Ear: External ear normal       Nose: Nose normal    Eyes:      Conjunctiva/sclera: Conjunctivae normal       Pupils: Pupils are equal, round, and reactive to light  Cardiovascular:      Rate and Rhythm: Normal rate and regular rhythm  Heart sounds: Normal heart sounds  Pulmonary:      Effort: Pulmonary effort is normal       Breath sounds: Normal breath sounds  Musculoskeletal:         General: Normal range of motion  Cervical back: Normal range of motion and neck supple  Skin:     General: Skin is warm and dry  Capillary Refill: Capillary refill takes less than 2 seconds  Neurological:      General: No focal deficit present  Mental Status: He is alert and oriented to person, place, and time  Deep Tendon Reflexes: Reflexes are normal and symmetric  Psychiatric:         Mood and Affect: Mood normal          Behavior: Behavior normal          Thought Content:  Thought content normal          Judgment: Judgment normal        Kinnie Flow, DO  "

## 2024-03-19 ENCOUNTER — OFFICE VISIT (OUTPATIENT)
Dept: FAMILY MEDICINE CLINIC | Facility: CLINIC | Age: 39
End: 2024-03-19
Payer: COMMERCIAL

## 2024-03-19 VITALS
TEMPERATURE: 97.7 F | SYSTOLIC BLOOD PRESSURE: 132 MMHG | WEIGHT: 184.2 LBS | DIASTOLIC BLOOD PRESSURE: 84 MMHG | HEART RATE: 81 BPM | RESPIRATION RATE: 16 BRPM | OXYGEN SATURATION: 98 % | BODY MASS INDEX: 28.91 KG/M2 | HEIGHT: 67 IN

## 2024-03-19 DIAGNOSIS — Z00.00 HEALTH CARE MAINTENANCE: Primary | ICD-10-CM

## 2024-03-19 DIAGNOSIS — E66.3 OVERWEIGHT (BMI 25.0-29.9): ICD-10-CM

## 2024-03-19 DIAGNOSIS — D56.3 ALPHA THALASSEMIA MINOR: ICD-10-CM

## 2024-03-19 DIAGNOSIS — E78.5 HYPERLIPIDEMIA, UNSPECIFIED HYPERLIPIDEMIA TYPE: ICD-10-CM

## 2024-03-19 DIAGNOSIS — J30.2 SEASONAL ALLERGIES: ICD-10-CM

## 2024-03-19 PROCEDURE — 99395 PREV VISIT EST AGE 18-39: CPT | Performed by: FAMILY MEDICINE

## 2024-03-19 RX ORDER — UBIDECARENONE 50 MG
1 CAPSULE ORAL DAILY
COMMUNITY

## 2024-03-19 RX ORDER — MONTELUKAST SODIUM 10 MG/1
10 TABLET ORAL DAILY
COMMUNITY
Start: 2024-03-09

## 2024-03-19 NOTE — PROGRESS NOTES
Chief Complaint   Patient presents with    Well Check     Patient Instructions   Here for General PE and will rec diet and exercise and check cholesterol level and is on red yeast rice and follow low cholesterol diet as directed. Lose weight to get BMI lower than 25. Seasonal allergy precautions and uses albuterol prn sob/wheeze. Monitor for ticks.   Assessment/Plan:    No problem-specific Assessment & Plan notes found for this encounter.       Diagnoses and all orders for this visit:    Health care maintenance  -     Comprehensive metabolic panel; Future  -     CBC and differential; Future  -     Lipid Panel with Direct LDL reflex; Future    Alpha thalassemia minor  -     CBC and differential; Future    Hyperlipidemia, unspecified hyperlipidemia type  -     Comprehensive metabolic panel; Future  -     Lipid Panel with Direct LDL reflex; Future    Overweight (BMI 25.0-29.9)  -     Comprehensive metabolic panel; Future  -     Lipid Panel with Direct LDL reflex; Future    Seasonal allergies    Other orders  -     montelukast (SINGULAIR) 10 mg tablet; Take 10 mg by mouth daily  -     Red Yeast Rice 600 MG TABS; Take 1 tablet by mouth in the morning          Subjective:      Patient ID: Lux Pradhan is a 38 y.o. male.    Here for general PE and is exercising and eating healthy and also is  and and has 2 children. Patient was eating 5 eggs per day. He stopped doing this and is on red yeast rice. Non smoker and social alcohol use 2-3 times per year.         The following portions of the patient's history were reviewed and updated as appropriate: allergies, current medications, past family history, past medical history, past social history, past surgical history, and problem list.    Review of Systems   Constitutional: Negative.    HENT: Negative.     Eyes: Negative.    Respiratory: Negative.     Cardiovascular: Negative.    Gastrointestinal: Negative.    Endocrine: Negative.    Genitourinary: Negative.   "  Musculoskeletal: Negative.    Skin: Negative.    Allergic/Immunologic: Negative.    Neurological: Negative.    Hematological: Negative.    Psychiatric/Behavioral: Negative.           Objective:      /84   Pulse 81   Temp 97.7 °F (36.5 °C) (Temporal)   Resp 16   Ht 5' 7\" (1.702 m)   Wt 83.6 kg (184 lb 3.2 oz)   SpO2 98%   BMI 28.85 kg/m²          Physical Exam  Constitutional:       Appearance: He is well-developed.      Comments: overweight   HENT:      Head: Normocephalic and atraumatic.      Right Ear: Tympanic membrane, ear canal and external ear normal.      Left Ear: Tympanic membrane, ear canal and external ear normal.      Nose: Nose normal.      Mouth/Throat:      Mouth: Mucous membranes are moist.   Eyes:      Conjunctiva/sclera: Conjunctivae normal.      Pupils: Pupils are equal, round, and reactive to light.   Cardiovascular:      Rate and Rhythm: Normal rate and regular rhythm.      Pulses: Normal pulses.      Heart sounds: Normal heart sounds.   Pulmonary:      Effort: Pulmonary effort is normal.      Breath sounds: Normal breath sounds.   Abdominal:      General: Abdomen is flat. Bowel sounds are normal.      Palpations: Abdomen is soft.   Genitourinary:     Penis: Normal.       Testes: Normal.   Musculoskeletal:         General: Normal range of motion.      Cervical back: Normal range of motion and neck supple.   Skin:     General: Skin is warm and dry.      Capillary Refill: Capillary refill takes less than 2 seconds.   Neurological:      General: No focal deficit present.      Mental Status: He is alert and oriented to person, place, and time. Mental status is at baseline.      Deep Tendon Reflexes: Reflexes are normal and symmetric.   Psychiatric:         Mood and Affect: Mood normal.         Behavior: Behavior normal.         Thought Content: Thought content normal.         Judgment: Judgment normal.            "

## 2024-03-19 NOTE — PATIENT INSTRUCTIONS
Here for General PE and will rec diet and exercise and check cholesterol level and is on red yeast rice and follow low cholesterol diet as directed. Lose weight to get BMI lower than 25. Seasonal allergy precautions and uses albuterol prn sob/wheeze. Monitor for ticks.

## 2024-03-22 LAB
ALBUMIN SERPL-MCNC: 4.5 G/DL (ref 3.6–5.1)
ALBUMIN/GLOB SERPL: 1.7 (CALC) (ref 1–2.5)
ALP SERPL-CCNC: 47 U/L (ref 36–130)
ALT SERPL-CCNC: 14 U/L (ref 9–46)
AST SERPL-CCNC: 16 U/L (ref 10–40)
BASOPHILS # BLD AUTO: 21 CELLS/UL (ref 0–200)
BASOPHILS NFR BLD AUTO: 0.5 %
BILIRUB SERPL-MCNC: 0.3 MG/DL (ref 0.2–1.2)
BUN SERPL-MCNC: 12 MG/DL (ref 7–25)
BUN/CREAT SERPL: NORMAL (CALC) (ref 6–22)
CALCIUM SERPL-MCNC: 9.7 MG/DL (ref 8.6–10.3)
CHLORIDE SERPL-SCNC: 103 MMOL/L (ref 98–110)
CHOLEST SERPL-MCNC: 184 MG/DL
CHOLEST/HDLC SERPL: 3.3 (CALC)
CO2 SERPL-SCNC: 29 MMOL/L (ref 20–32)
CREAT SERPL-MCNC: 1.14 MG/DL (ref 0.6–1.26)
EOSINOPHIL # BLD AUTO: 119 CELLS/UL (ref 15–500)
EOSINOPHIL NFR BLD AUTO: 2.9 %
ERYTHROCYTE [DISTWIDTH] IN BLOOD BY AUTOMATED COUNT: 13.9 % (ref 11–15)
GFR/BSA.PRED SERPLBLD CYS-BASED-ARV: 84 ML/MIN/1.73M2
GLOBULIN SER CALC-MCNC: 2.6 G/DL (CALC) (ref 1.9–3.7)
GLUCOSE SERPL-MCNC: 87 MG/DL (ref 65–99)
HCT VFR BLD AUTO: 44.2 % (ref 38.5–50)
HDLC SERPL-MCNC: 55 MG/DL
HGB BLD-MCNC: 13.7 G/DL (ref 13.2–17.1)
LDLC SERPL CALC-MCNC: 116 MG/DL (CALC)
LYMPHOCYTES # BLD AUTO: 1804 CELLS/UL (ref 850–3900)
LYMPHOCYTES NFR BLD AUTO: 44 %
MCH RBC QN AUTO: 22.3 PG (ref 27–33)
MCHC RBC AUTO-ENTMCNC: 31 G/DL (ref 32–36)
MCV RBC AUTO: 72.1 FL (ref 80–100)
MONOCYTES # BLD AUTO: 369 CELLS/UL (ref 200–950)
MONOCYTES NFR BLD AUTO: 9 %
NEUTROPHILS # BLD AUTO: 1788 CELLS/UL (ref 1500–7800)
NEUTROPHILS NFR BLD AUTO: 43.6 %
NONHDLC SERPL-MCNC: 129 MG/DL (CALC)
PLATELET # BLD AUTO: 330 THOUSAND/UL (ref 140–400)
PMV BLD REES-ECKER: 10.8 FL (ref 7.5–12.5)
POTASSIUM SERPL-SCNC: 4.4 MMOL/L (ref 3.5–5.3)
PROT SERPL-MCNC: 7.1 G/DL (ref 6.1–8.1)
RBC # BLD AUTO: 6.13 MILLION/UL (ref 4.2–5.8)
SODIUM SERPL-SCNC: 139 MMOL/L (ref 135–146)
TRIGL SERPL-MCNC: 51 MG/DL
WBC # BLD AUTO: 4.1 THOUSAND/UL (ref 3.8–10.8)

## 2025-03-26 RX ORDER — CETIRIZINE HYDROCHLORIDE 10 MG/1
CAPSULE, LIQUID FILLED ORAL
COMMUNITY

## 2025-03-27 ENCOUNTER — OFFICE VISIT (OUTPATIENT)
Dept: FAMILY MEDICINE CLINIC | Facility: CLINIC | Age: 40
End: 2025-03-27
Payer: COMMERCIAL

## 2025-03-27 VITALS
WEIGHT: 178 LBS | HEIGHT: 67 IN | OXYGEN SATURATION: 97 % | HEART RATE: 82 BPM | RESPIRATION RATE: 16 BRPM | SYSTOLIC BLOOD PRESSURE: 120 MMHG | DIASTOLIC BLOOD PRESSURE: 80 MMHG | TEMPERATURE: 97.2 F | BODY MASS INDEX: 27.94 KG/M2

## 2025-03-27 DIAGNOSIS — Z00.00 HEALTH CARE MAINTENANCE: Primary | ICD-10-CM

## 2025-03-27 DIAGNOSIS — E78.5 HYPERLIPIDEMIA, UNSPECIFIED HYPERLIPIDEMIA TYPE: ICD-10-CM

## 2025-03-27 DIAGNOSIS — D56.3 ALPHA THALASSEMIA MINOR: ICD-10-CM

## 2025-03-27 DIAGNOSIS — E66.3 OVERWEIGHT (BMI 25.0-29.9): ICD-10-CM

## 2025-03-27 DIAGNOSIS — J30.2 SEASONAL ALLERGIES: ICD-10-CM

## 2025-03-27 PROCEDURE — 99395 PREV VISIT EST AGE 18-39: CPT | Performed by: FAMILY MEDICINE

## 2025-03-27 RX ORDER — HYDROQUINONE 40 MG/G
CREAM TOPICAL
COMMUNITY
Start: 2025-02-08

## 2025-03-27 NOTE — ASSESSMENT & PLAN NOTE
Low cholesterol diet encouraged  Orders:    Lipid Panel with Direct LDL reflex; Future    Comprehensive metabolic panel; Future

## 2025-03-27 NOTE — PROGRESS NOTES
Adult Annual Physical  Name: Lux Pradhan      : 1985      MRN: [id number]  Encounter Provider: Abe Mathew DO  Encounter Date: 3/27/2025   Encounter department: [facility]  Chief Complaint   Patient presents with    Well Check     Patient Instructions   Here for general PE and rec eating healthy and exercising. Monitor for ticks. Takes red yeast rice. Rec avoiding processed and get at least 8 hours sleep. Uses [facility] maia. Call if any problems. Monitor left sided head lesion and f-up with dermatology as directed and see eye doctor and dentist.     Assessment & Plan  Health care maintenance  Stable and rec exercise   Orders:    CBC and differential; Future    Lipid Panel with Direct LDL reflex; Future    Comprehensive metabolic panel; Future    Hyperlipidemia, unspecified hyperlipidemia type  Low cholesterol diet encouraged  Orders:    Lipid Panel with Direct LDL reflex; Future    Comprehensive metabolic panel; Future    Overweight (BMI 25.0-29.9)  Lose weight as directed         Seasonal allergies  Stable on meds       Alpha thalassemia minor  Stable and check labs.        Preventive Screenings:    - Prostate cancer screening: screening not indicated     Immunizations:  - Immunizations due: Influenza and Prevnar 20      Depression Screening and Follow-up Plan: Patient was screened for depression during today's encounter. They screened negative with a PHQ-2 score of 1.          History of Present Illness     Adult Annual Physical:  Patient presents for annual physical. Patient is  and has 2 children. Patient sleeps 8 hours sleep per night. Patient is a  rep. .     Diet and Physical Activity:  - Diet/Nutrition: well balanced diet, limited junk food and intermittent fasting. Low cholesterol diet  - Exercise: vigorous cardiovascular exercise and 5-7 times a week on average. Strength training 6 days per week and cardio 1 day per week.    Depression  Screening:  - PHQ-2 Score: 1    General Health:  - Sleep: 7-8 hours of sleep on average and snores loudly. My wife says I snore  - Hearing: normal hearing right ear and normal hearing left ear.  - Vision: no vision problems, most recent eye exam > 1 year ago and wears glasses. I have an eye doctor appointment scheduled for 6/6/25.  - Dental: regular dental visits and brushes teeth three times daily. I just completed my orthodontic treatment with Invisalign on 3/21/25.    /GYN Health:  - Follows with GYN: no.   - History of STDs: no     Health:  - History of STDs: no.   - Urinary symptoms: none.     Advanced Care Planning:  - Has an advanced directive?: no    - Has a durable medical POA?: no      Review of Systems   Constitutional: Negative.    HENT: Negative.     Eyes: Negative.    Respiratory: Negative.     Cardiovascular: Negative.    Gastrointestinal: Negative.    Endocrine: Negative.    Genitourinary: Negative.    Musculoskeletal: Negative.    Skin: Negative.    Allergic/Immunologic: Negative.    Neurological: Negative.    Hematological: Negative.    Psychiatric/Behavioral: Negative.       Current Outpatient Medications on File Prior to Visit   Medication Sig Dispense Refill    Apple Cider Vinegar 188 MG CAPS Take 1 capsule by mouth daily      Ascorbic Acid (VITAMIN C) 100 MG tablet Take 1 tablet by mouth daily      Cetirizine HCl (ZyrTEC Allergy) 10 MG CAPS       Cholecalciferol (VITAMIN D3) 3000 units TABS Take 1 tablet by mouth daily      ferrous sulfate 325 (65 Fe) mg tablet Take 325 mg by mouth daily with breakfast      Flaxseed, Linseed, (FLAXSEED OIL) 1000 MG CAPS Take by mouth      hydroquinone 4 % cream APPLY A SMALL AMOUNT AFFECTED AREA S) EVERY MORNING      montelukast (SINGULAIR) 10 mg tablet Take 10 mg by mouth daily      multivitamin (THERAGRAN) TABS Take 1 tablet by mouth daily      Omega-3 Fatty Acids (FISH OIL) 645 MG CAPS Take 1 capsule by mouth daily      Red Yeast Rice 600 MG TABS Take 1  "tablet by mouth in the morning      [DISCONTINUED] Loratadine (CLARITIN PO) Take by mouth daily (Patient not taking: Reported on 3/19/2024)       No current facility-administered medications on file prior to visit.        Objective   /80   Pulse 82   Temp (!) 97.2 °F (36.2 °C) (Temporal)   Resp 16   Ht 5' 7\" (1.702 m)   Wt 80.7 kg (178 lb)   SpO2 97%   BMI 27.88 kg/m²     Physical Exam  Constitutional:       Appearance: He is well-developed.      Comments: overweight   HENT:      Head: Normocephalic and atraumatic.      Right Ear: External ear normal.      Left Ear: External ear normal.      Nose: Nose normal.      Mouth/Throat:      Mouth: Mucous membranes are moist.   Eyes:      Conjunctiva/sclera: Conjunctivae normal.      Pupils: Pupils are equal, round, and reactive to light.   Cardiovascular:      Rate and Rhythm: Normal rate and regular rhythm.      Pulses: Normal pulses.      Heart sounds: Normal heart sounds.   Pulmonary:      Effort: Pulmonary effort is normal.      Breath sounds: Normal breath sounds.   Abdominal:      General: Abdomen is flat. Bowel sounds are normal.      Palpations: Abdomen is soft.   Genitourinary:     Penis: Normal.       Testes: Normal.   Musculoskeletal:         General: Normal range of motion.      Cervical back: Normal range of motion and neck supple.   Skin:     General: Skin is warm and dry.      Capillary Refill: Capillary refill takes less than 2 seconds.   Neurological:      General: No focal deficit present.      Mental Status: He is alert and oriented to person, place, and time. Mental status is at baseline.      Deep Tendon Reflexes: Reflexes are normal and symmetric.   Psychiatric:         Mood and Affect: Mood normal.         Behavior: Behavior normal.         Thought Content: Thought content normal.         Judgment: Judgment normal.       Administrative Statements   I have spent a total time of 35 minutes in caring for this patient on the day of the " visit/encounter including Diagnostic results, Prognosis, Risks and benefits of tx options, Instructions for management, Patient and family education, Importance of tx compliance, Risk factor reductions, Impressions, Counseling / Coordination of care, Documenting in the medical record, Reviewing/placing orders in the medical record (including tests, medications, and/or procedures), and Obtaining or reviewing history  .

## 2025-03-27 NOTE — PATIENT INSTRUCTIONS
Here for general PE and rec eating healthy and exercising. Monitor for ticks. Takes red yeast rice. Rec avoiding processed and get at least 8 hours sleep. Uses [facility] maia. Call if any problems. Monitor left sided head lesion and f-up with dermatology as directed and see eye doctor and dentist.

## 2025-04-01 ENCOUNTER — RESULTS FOLLOW-UP (OUTPATIENT)
Dept: FAMILY MEDICINE CLINIC | Facility: CLINIC | Age: 40
End: 2025-04-01

## 2025-04-01 LAB
ALBUMIN SERPL-MCNC: 4.7 G/DL (ref 3.6–5.1)
ALBUMIN/GLOB SERPL: 2 (CALC) (ref 1–2.5)
ALP SERPL-CCNC: 40 U/L (ref 36–130)
ALT SERPL-CCNC: 11 U/L (ref 9–46)
AST SERPL-CCNC: 15 U/L (ref 10–40)
BASOPHILS # BLD AUTO: 19 CELLS/UL (ref 0–200)
BASOPHILS NFR BLD AUTO: 0.6 %
BILIRUB SERPL-MCNC: 0.3 MG/DL (ref 0.2–1.2)
BUN SERPL-MCNC: 11 MG/DL (ref 7–25)
BUN/CREAT SERPL: NORMAL (CALC) (ref 6–22)
CALCIUM SERPL-MCNC: 9.7 MG/DL (ref 8.6–10.3)
CHLORIDE SERPL-SCNC: 104 MMOL/L (ref 98–110)
CHOLEST SERPL-MCNC: 201 MG/DL
CHOLEST/HDLC SERPL: 3.2 (CALC)
CO2 SERPL-SCNC: 30 MMOL/L (ref 20–32)
CREAT SERPL-MCNC: 1.17 MG/DL (ref 0.6–1.26)
EOSINOPHIL # BLD AUTO: 128 CELLS/UL (ref 15–500)
EOSINOPHIL NFR BLD AUTO: 4 %
ERYTHROCYTE [DISTWIDTH] IN BLOOD BY AUTOMATED COUNT: 13.6 % (ref 11–15)
GFR/BSA.PRED SERPLBLD CYS-BASED-ARV: 81 ML/MIN/1.73M2
GLOBULIN SER CALC-MCNC: 2.4 G/DL (CALC) (ref 1.9–3.7)
GLUCOSE SERPL-MCNC: 88 MG/DL (ref 65–99)
HCT VFR BLD AUTO: 43.8 % (ref 38.5–50)
HDLC SERPL-MCNC: 62 MG/DL
HGB BLD-MCNC: 13.4 G/DL (ref 13.2–17.1)
LDLC SERPL CALC-MCNC: 126 MG/DL (CALC)
LYMPHOCYTES # BLD AUTO: 1696 CELLS/UL (ref 850–3900)
LYMPHOCYTES NFR BLD AUTO: 53 %
MCH RBC QN AUTO: 22.5 PG (ref 27–33)
MCHC RBC AUTO-ENTMCNC: 30.6 G/DL (ref 32–36)
MCV RBC AUTO: 73.5 FL (ref 80–100)
MONOCYTES # BLD AUTO: 221 CELLS/UL (ref 200–950)
MONOCYTES NFR BLD AUTO: 6.9 %
NEUTROPHILS # BLD AUTO: 1136 CELLS/UL (ref 1500–7800)
NEUTROPHILS NFR BLD AUTO: 35.5 %
NONHDLC SERPL-MCNC: 139 MG/DL (CALC)
PLATELET # BLD AUTO: 309 THOUSAND/UL (ref 140–400)
PMV BLD REES-ECKER: 10.4 FL (ref 7.5–12.5)
POTASSIUM SERPL-SCNC: 4.2 MMOL/L (ref 3.5–5.3)
PROT SERPL-MCNC: 7.1 G/DL (ref 6.1–8.1)
RBC # BLD AUTO: 5.96 MILLION/UL (ref 4.2–5.8)
SODIUM SERPL-SCNC: 139 MMOL/L (ref 135–146)
TRIGL SERPL-MCNC: 44 MG/DL
WBC # BLD AUTO: 3.2 THOUSAND/UL (ref 3.8–10.8)

## 2025-04-01 NOTE — TELEPHONE ENCOUNTER
----- Message from Abe Mathew DO sent at 4/1/2025 10:39 AM EDT -----  Please call the patient regarding his abnormal result. Low cholesterol diet encouraged for borderline elevated ldl.

## 2025-04-03 NOTE — TELEPHONE ENCOUNTER
Patient returned call to practice, conveyed provider results review, patient expressed understanding. Patient Has no further questions at this time. Please advise Patient at [phone], if any further questions.